# Patient Record
Sex: MALE | Race: WHITE | NOT HISPANIC OR LATINO | Employment: OTHER | URBAN - METROPOLITAN AREA
[De-identification: names, ages, dates, MRNs, and addresses within clinical notes are randomized per-mention and may not be internally consistent; named-entity substitution may affect disease eponyms.]

---

## 2024-01-19 ENCOUNTER — TELEPHONE (OUTPATIENT)
Dept: HEALTH INFORMATION MANAGEMENT | Facility: OTHER | Age: 64
End: 2024-01-19
Payer: COMMERCIAL

## 2024-02-02 ENCOUNTER — APPOINTMENT (OUTPATIENT)
Dept: RADIOLOGY | Facility: IMAGING CENTER | Age: 64
End: 2024-02-02
Attending: NURSE PRACTITIONER
Payer: COMMERCIAL

## 2024-02-02 ENCOUNTER — OFFICE VISIT (OUTPATIENT)
Dept: URGENT CARE | Facility: CLINIC | Age: 64
End: 2024-02-02
Payer: COMMERCIAL

## 2024-02-02 VITALS
DIASTOLIC BLOOD PRESSURE: 60 MMHG | RESPIRATION RATE: 18 BRPM | OXYGEN SATURATION: 88 % | HEIGHT: 68 IN | SYSTOLIC BLOOD PRESSURE: 120 MMHG | BODY MASS INDEX: 40.92 KG/M2 | HEART RATE: 89 BPM | WEIGHT: 270 LBS | TEMPERATURE: 99.9 F

## 2024-02-02 DIAGNOSIS — R06.02 SOB (SHORTNESS OF BREATH): ICD-10-CM

## 2024-02-02 DIAGNOSIS — J44.1 COPD EXACERBATION (HCC): ICD-10-CM

## 2024-02-02 DIAGNOSIS — R60.9 EDEMA, UNSPECIFIED TYPE: ICD-10-CM

## 2024-02-02 PROCEDURE — 71046 X-RAY EXAM CHEST 2 VIEWS: CPT | Mod: TC | Performed by: RADIOLOGY

## 2024-02-02 PROCEDURE — 3074F SYST BP LT 130 MM HG: CPT | Performed by: NURSE PRACTITIONER

## 2024-02-02 PROCEDURE — 3078F DIAST BP <80 MM HG: CPT | Performed by: NURSE PRACTITIONER

## 2024-02-02 PROCEDURE — 99204 OFFICE O/P NEW MOD 45 MIN: CPT | Mod: 25 | Performed by: NURSE PRACTITIONER

## 2024-02-02 PROCEDURE — 94640 AIRWAY INHALATION TREATMENT: CPT | Performed by: NURSE PRACTITIONER

## 2024-02-02 RX ORDER — PREDNISONE 10 MG/1
40 TABLET ORAL DAILY
Qty: 20 TABLET | Refills: 0 | Status: SHIPPED | OUTPATIENT
Start: 2024-02-02 | End: 2024-02-07

## 2024-02-02 RX ORDER — ALBUTEROL SULFATE 90 UG/1
2 AEROSOL, METERED RESPIRATORY (INHALATION) EVERY 6 HOURS PRN
Qty: 8.5 G | Refills: 0 | Status: SHIPPED | OUTPATIENT
Start: 2024-02-02

## 2024-02-02 RX ORDER — DEXAMETHASONE SODIUM PHOSPHATE 10 MG/ML
10 INJECTION INTRAMUSCULAR; INTRAVENOUS ONCE
Status: COMPLETED | OUTPATIENT
Start: 2024-02-02 | End: 2024-02-02

## 2024-02-02 RX ORDER — IPRATROPIUM BROMIDE AND ALBUTEROL SULFATE 2.5; .5 MG/3ML; MG/3ML
3 SOLUTION RESPIRATORY (INHALATION) ONCE
Status: COMPLETED | OUTPATIENT
Start: 2024-02-02 | End: 2024-02-02

## 2024-02-02 RX ORDER — DULOXETIN HYDROCHLORIDE 60 MG/1
90 CAPSULE, DELAYED RELEASE ORAL DAILY
COMMUNITY

## 2024-02-02 RX ORDER — ALBUTEROL SULFATE 90 UG/1
2 AEROSOL, METERED RESPIRATORY (INHALATION) EVERY 6 HOURS PRN
COMMUNITY

## 2024-02-02 RX ORDER — GABAPENTIN 400 MG/1
600 CAPSULE ORAL 3 TIMES DAILY
COMMUNITY
End: 2024-03-01

## 2024-02-02 RX ADMIN — DEXAMETHASONE SODIUM PHOSPHATE 10 MG: 10 INJECTION INTRAMUSCULAR; INTRAVENOUS at 19:53

## 2024-02-02 RX ADMIN — IPRATROPIUM BROMIDE AND ALBUTEROL SULFATE 3 ML: 2.5; .5 SOLUTION RESPIRATORY (INHALATION) at 19:53

## 2024-02-02 ASSESSMENT — ENCOUNTER SYMPTOMS
PALPITATIONS: 0
SPUTUM PRODUCTION: 1
ORTHOPNEA: 1
FEVER: 0
CHILLS: 0
SORE THROAT: 0
SHORTNESS OF BREATH: 1
WHEEZING: 1
COUGH: 1

## 2024-02-03 NOTE — PROGRESS NOTES
Subjective:   Rip Guidry is a 63 y.o. male who presents for Shortness of Breath (X 2 weeks, trouble breathing, hard to breath when laying flat, was giving medication from the RN at the shelter slight improvement )      HPI  Patient is a 63-year-old male who presents urgent care for evaluation of increased shortness of breath that is been going on for the past 2 weeks.  Patient states that he has been having trouble breathing hard to lay flat when he is sleeping.  Patient does live in a shelter in which she was prescribed a medication to help improve his breathing which he is uncertain the name of the medication.  States he did take it for 5 days with mild improvement.  He does have a history of COPD.  Does not wear home O2.  Patient has been experiencing bilateral lower extremity swelling has been followed by PCP in which they have tried Lasix with no significant improvement.  Patient verbalizing that PCP has obtained EKG with no acute findings.  He has no chest pain on today's exam.  Patient is in a wheelchair is able to ambulate and short distances.  Patient adamantly refusing to go to the emergency room for his condition as he knows they will likely admit him.  Patient concerned that if he is admitted in the emergency room he will lose his bed in the shelter and be put out in the streets increasing his risk of mortality due to all underlying health conditions.  Review of Systems   Constitutional:  Positive for malaise/fatigue. Negative for chills and fever.   HENT:  Negative for congestion and sore throat.    Respiratory:  Positive for cough, sputum production, shortness of breath and wheezing.    Cardiovascular:  Positive for orthopnea and leg swelling. Negative for chest pain and palpitations.       Medications:    albuterol Aers  dexamethasone  DULoxetine Cpep  gabapentin Caps  ipratropium-albuterol  metFORMIN Tabs    Allergies: Sulfa drugs    Problem List: Rip Guidry does not have a  "problem list on file.    Surgical History:  No past surgical history on file.    Past Social Hx: Rip Guidry  reports that he has been smoking cigarettes. He has never used smokeless tobacco. He reports that he does not currently use alcohol. He reports that he does not use drugs.     Past Family Hx:  Rip Guidry family history is not on file.     Problem list, medications, and allergies reviewed by myself today in Epic.     Objective:     /60 (BP Location: Left arm, Patient Position: Sitting, BP Cuff Size: Large adult)   Pulse 89   Temp 37.7 °C (99.9 °F) (Temporal)   Resp 18   Ht 1.727 m (5' 8\")   Wt 122 kg (270 lb) Comment: state the pt  SpO2 88%   BMI 41.05 kg/m²     Physical Exam  Vitals and nursing note reviewed.   Constitutional:       General: He is not in acute distress.     Appearance: He is well-developed.   HENT:      Head: Normocephalic and atraumatic.      Right Ear: Tympanic membrane and external ear normal.      Left Ear: Tympanic membrane and external ear normal.      Nose: Nose normal.      Right Sinus: No maxillary sinus tenderness or frontal sinus tenderness.      Left Sinus: No maxillary sinus tenderness or frontal sinus tenderness.      Mouth/Throat:      Mouth: Mucous membranes are moist.      Pharynx: Uvula midline. No posterior oropharyngeal erythema.      Tonsils: No tonsillar exudate or tonsillar abscesses.   Eyes:      General:         Right eye: No discharge.         Left eye: No discharge.      Conjunctiva/sclera: Conjunctivae normal.   Cardiovascular:      Rate and Rhythm: Normal rate.   Pulmonary:      Effort: Pulmonary effort is normal. No respiratory distress.      Breath sounds: Wheezing present.   Abdominal:      General: There is no distension.   Musculoskeletal:         General: Normal range of motion.      Right lower leg: Tenderness present. No bony tenderness. 2+ Edema present.      Left lower leg: Tenderness present. No bony tenderness. 2+ " Edema present.   Skin:     General: Skin is warm and dry.      Findings: No erythema.             Comments: Bilateral lower extremities with discoloration   Neurological:      General: No focal deficit present.      Mental Status: He is alert and oriented to person, place, and time. Mental status is at baseline.      Gait: Gait (gait at baseline) normal.   Psychiatric:         Judgment: Judgment normal.         Assessment/Plan:     Diagnosis and associated orders:     1. SOB (shortness of breath)  DX-CHEST-2 VIEWS    ipratropium-albuterol (DUONEB) nebulizer solution    dexamethasone (Decadron) injection (check route below) 10 mg      2. COPD exacerbation (HCC)  predniSONE (DELTASONE) 10 MG Tab    albuterol 108 (90 Base) MCG/ACT Aero Soln inhalation aerosol      3. Edema, unspecified type             Comments/MDM:     I independently reviewed the patient's imaging and agree with the interpretation of the radiologist.    IMPRESSION:     1.  Mildly enlarged cardiac silhouette with minimal interstitial opacities in the lower lobes which could represent changes of edema. No focal pneumonia.          I personally reviewed prior external notes and prior test results pertinent to today's visit.  Patient able to speak in full sentences, not tachypneic, O2 at rest 88 to 92% on room air.  Patient is in a wheelchair has no chest pain.  X-ray negative for any focal pneumonia patient does have dependent edema concerning signs of CHF.  Patient again adamantly refusing emergency room evaluation as he does not want to get admitted even though he is well aware of his condition.  Patient concerned if he is admitted in the hospital he will be living on the streets increasing his risk of mortality due to the winter conditions.  Discussed risks of not seeking medical attention patient verbalizing understanding on exam he does have audible wheezing does have a history of seasonal COPD for this I will treat him with prednisone breathing  treatment was administered in clinic having improvement.  Patient will follow-up with soon as possible with PCP as he will likely need another round of diuretics.  Discussed management options, risks and benefits, and alternatives to treatment plan agreed upon.   Red flags discussed and indications to immediately call 911 or present to the Emergency Department.   Supportive care, differential diagnoses, and indications for immediate follow-up discussed with patient.    Patient expresses understanding and agrees to plan. Patient denies any other questions or concerns.                      Please note that this dictation was created using voice recognition software. I have made a reasonable attempt to correct obvious errors, but I expect that there are errors of grammar and possibly content that I did not discover before finalizing the note.    This note was electronically signed by Justo SHAW.

## 2024-03-01 ENCOUNTER — OFFICE VISIT (OUTPATIENT)
Dept: CARDIOLOGY | Facility: MEDICAL CENTER | Age: 64
End: 2024-03-01
Attending: INTERNAL MEDICINE
Payer: COMMERCIAL

## 2024-03-01 VITALS
RESPIRATION RATE: 16 BRPM | HEART RATE: 78 BPM | OXYGEN SATURATION: 91 % | BODY MASS INDEX: 4.09 KG/M2 | HEIGHT: 68 IN | SYSTOLIC BLOOD PRESSURE: 106 MMHG | DIASTOLIC BLOOD PRESSURE: 70 MMHG | WEIGHT: 27 LBS

## 2024-03-01 DIAGNOSIS — I50.9 CHRONIC CONGESTIVE HEART FAILURE, UNSPECIFIED HEART FAILURE TYPE (HCC): ICD-10-CM

## 2024-03-01 DIAGNOSIS — R06.02 SOB (SHORTNESS OF BREATH): ICD-10-CM

## 2024-03-01 PROCEDURE — 99204 OFFICE O/P NEW MOD 45 MIN: CPT | Performed by: INTERNAL MEDICINE

## 2024-03-01 PROCEDURE — 99213 OFFICE O/P EST LOW 20 MIN: CPT | Performed by: INTERNAL MEDICINE

## 2024-03-01 PROCEDURE — 93005 ELECTROCARDIOGRAM TRACING: CPT | Performed by: INTERNAL MEDICINE

## 2024-03-01 PROCEDURE — 3074F SYST BP LT 130 MM HG: CPT | Performed by: INTERNAL MEDICINE

## 2024-03-01 PROCEDURE — 3078F DIAST BP <80 MM HG: CPT | Performed by: INTERNAL MEDICINE

## 2024-03-01 RX ORDER — FLUTICASONE PROPIONATE 50 MCG
SPRAY, SUSPENSION (ML) NASAL
COMMUNITY
Start: 2024-02-20

## 2024-03-01 RX ORDER — PREDNISONE 20 MG/1
TABLET ORAL
COMMUNITY
Start: 2023-12-28

## 2024-03-01 RX ORDER — TRAZODONE HYDROCHLORIDE 50 MG/1
TABLET ORAL
COMMUNITY
Start: 2024-01-22

## 2024-03-01 RX ORDER — CELECOXIB 200 MG/1
CAPSULE ORAL
COMMUNITY
Start: 2024-02-14

## 2024-03-01 RX ORDER — TIOTROPIUM BROMIDE INHALATION SPRAY 3.12 UG/1
SPRAY, METERED RESPIRATORY (INHALATION)
COMMUNITY
Start: 2024-02-14

## 2024-03-01 RX ORDER — POTASSIUM CHLORIDE 20 MEQ/1
TABLET, EXTENDED RELEASE ORAL
COMMUNITY
Start: 2024-02-12

## 2024-03-01 RX ORDER — CYCLOBENZAPRINE HCL 10 MG
TABLET ORAL
COMMUNITY
Start: 2024-02-26

## 2024-03-01 RX ORDER — SPIRONOLACTONE 50 MG/1
TABLET, FILM COATED ORAL
COMMUNITY
Start: 2024-02-14

## 2024-03-01 RX ORDER — LORATADINE 10 MG/1
TABLET ORAL
COMMUNITY
Start: 2023-12-28

## 2024-03-01 RX ORDER — FLUTICASONE FUROATE, UMECLIDINIUM BROMIDE AND VILANTEROL TRIFENATATE 100; 62.5; 25 UG/1; UG/1; UG/1
POWDER RESPIRATORY (INHALATION)
COMMUNITY
Start: 2023-12-28

## 2024-03-01 RX ORDER — OMEPRAZOLE 20 MG/1
CAPSULE, DELAYED RELEASE ORAL
COMMUNITY
Start: 2023-12-28

## 2024-03-01 RX ORDER — FUROSEMIDE 40 MG/1
TABLET ORAL
COMMUNITY
Start: 2024-02-26

## 2024-03-01 RX ORDER — HYDROXYZINE HYDROCHLORIDE 25 MG/1
TABLET, FILM COATED ORAL
COMMUNITY
Start: 2024-01-05

## 2024-03-01 RX ORDER — OXYCODONE AND ACETAMINOPHEN 10; 325 MG/1; MG/1
TABLET ORAL
COMMUNITY
Start: 2024-02-20

## 2024-03-01 NOTE — PROGRESS NOTES
Interventional cardiology Initial Consultation Note      Chief Complaint: Lower extremity edema, shortness of breath    Rip Guidry is a 63 y.o. male  patient presented today for evaluation of lower extremity edema, shortness of breath.  Chronic smoker, smokes about half a pack a day.  He was told he did have some heart problems when he was in prison.  He has worsening lower extremity edema, shortness of breath, recently prescribed Lasix which he has been taking for the last couple of days.        History reviewed. No pertinent past medical history.          Current Outpatient Medications   Medication Sig Dispense Refill    furosemide (LASIX) 40 MG Tab       celecoxib (CELEBREX) 200 MG Cap       cyclobenzaprine (FLEXERIL) 10 mg Tab       fluticasone (FLONASE) 50 MCG/ACT nasal spray       TRELEGY ELLIPTA 100-62.5-25 MCG/ACT inhaler       hydrOXYzine HCl (ATARAX) 25 MG Tab       loratadine (CLARITIN) 10 MG Tab       omeprazole (PRILOSEC) 20 MG delayed-release capsule       oxyCODONE-acetaminophen (PERCOCET-10)  MG Tab       potassium chloride SA (KDUR) 20 MEQ Tab CR       predniSONE (DELTASONE) 20 MG Tab       spironolactone (ALDACTONE) 50 MG Tab       SPIRIVA RESPIMAT 2.5 MCG/ACT Aero Soln       traZODone (DESYREL) 50 MG Tab       metFORMIN (GLUCOPHAGE) 500 MG Tab Take 500 mg by mouth 2 times a day with meals.      DULoxetine (CYMBALTA) 60 MG Cap DR Particles delayed-release capsule Take 90 mg by mouth every day.      albuterol 108 (90 Base) MCG/ACT Aero Soln inhalation aerosol Inhale 2 Puffs every 6 hours as needed for Shortness of Breath.      albuterol 108 (90 Base) MCG/ACT Aero Soln inhalation aerosol Inhale 2 Puffs every 6 hours as needed for Shortness of Breath. 8.5 g 0     No current facility-administered medications for this visit.             Physical Exam:  Ambulatory Vitals  /70 (BP Location: Left arm, Patient Position: Sitting, BP Cuff Size: Adult)   Pulse 78   Resp 16   Ht  "1.727 m (5' 8\")   Wt (!) 12.2 kg (27 lb)   SpO2 91%    Oxygen Therapy:  Pulse Oximetry: 91 %  BP Readings from Last 4 Encounters:   03/01/24 106/70   02/02/24 120/60   12/28/15 131/78       Weight/BMI: Body mass index is 4.11 kg/m².  Wt Readings from Last 4 Encounters:   03/01/24 (!) 12.2 kg (27 lb)   02/02/24 122 kg (270 lb)   12/28/15 83 kg (183 lb)           General: Well appearing and in no apparent distress  Neck: carotid bruits absent  Lungs: respiratory sounds  normal  Heart: Regular rhythm,  No palpable thrills on palpation, murmurs absent, no rubs,   Lower extremity edema present.       EKG 3/1/2024  Sinus rhythm      Medical Decision Making:  Problem List Items Addressed This Visit    None  Visit Diagnoses       SOB (shortness of breath)        Relevant Orders    EKG (Completed)    EC-ECHOCARDIOGRAM COMPLETE W/O CONT    Chronic congestive heart failure, unspecified heart failure type (HCC)        Relevant Medications    furosemide (LASIX) 40 MG Tab    spironolactone (ALDACTONE) 50 MG Tab    Other Relevant Orders    [id number] PET W/FLOW RESERVE          His lower extremity edema, shortness of breath concerning for congestive heart failure.  Recommend echocardiogram, stress test to further evaluate.  Advised to take Lasix 40 mg twice daily for 3 or 4 days then 40 mg daily.  Further recommendations, medical therapy based on his ejection fraction, stress test results.  Counseled on smoking cessation.        This note was dictated using Dragon speech recognition software.    Armani ESCOTO  Interventional cardiologist  [facility]  [facility]  [address] [address]  Phone: [phone]  Fax: [phone]               "

## 2024-03-04 LAB — EKG IMPRESSION: NORMAL

## 2024-03-04 PROCEDURE — 93010 ELECTROCARDIOGRAM REPORT: CPT | Performed by: INTERNAL MEDICINE

## 2024-05-17 ENCOUNTER — OFFICE VISIT (OUTPATIENT)
Dept: URGENT CARE | Facility: CLINIC | Age: 64
End: 2024-05-17
Payer: COMMERCIAL

## 2024-05-17 VITALS
SYSTOLIC BLOOD PRESSURE: 122 MMHG | WEIGHT: 270 LBS | HEART RATE: 72 BPM | BODY MASS INDEX: 40.92 KG/M2 | HEIGHT: 68 IN | RESPIRATION RATE: 18 BRPM | DIASTOLIC BLOOD PRESSURE: 70 MMHG | OXYGEN SATURATION: 90 % | TEMPERATURE: 97.6 F

## 2024-05-17 DIAGNOSIS — R60.9 EDEMA, UNSPECIFIED TYPE: ICD-10-CM

## 2024-05-17 DIAGNOSIS — L03.119 CELLULITIS OF LOWER EXTREMITY, UNSPECIFIED LATERALITY: ICD-10-CM

## 2024-05-17 PROCEDURE — 3078F DIAST BP <80 MM HG: CPT | Performed by: FAMILY MEDICINE

## 2024-05-17 PROCEDURE — 99213 OFFICE O/P EST LOW 20 MIN: CPT | Performed by: FAMILY MEDICINE

## 2024-05-17 PROCEDURE — 3074F SYST BP LT 130 MM HG: CPT | Performed by: FAMILY MEDICINE

## 2024-05-17 RX ORDER — AMOXICILLIN AND CLAVULANATE POTASSIUM 875; 125 MG/1; MG/1
1 TABLET, FILM COATED ORAL 2 TIMES DAILY
Qty: 20 TABLET | Refills: 0 | Status: SHIPPED | OUTPATIENT
Start: 2024-05-17 | End: 2024-05-27

## 2024-05-17 ASSESSMENT — ENCOUNTER SYMPTOMS
CONSTITUTIONAL NEGATIVE: 1
COUGH: 1
SPUTUM PRODUCTION: 1
SHORTNESS OF BREATH: 1
LEG SWELLING: 1
MUSCULOSKELETAL NEGATIVE: 1
GASTROINTESTINAL NEGATIVE: 1
EYES NEGATIVE: 1

## 2024-05-17 NOTE — PROGRESS NOTES
"Subjective:   Rip Guidry Jr. is a 63 y.o. male who presents for Leg Swelling (SOB, L leg swelling x 1 year)      Chronic swelling in his legs, cough, being worked up for this by his regular doctor.  Has increased his lasix 40 mg, is taking potassium as well.  He is concerned about the legs getting infected again.  The swelling is down but the skin has broken down with blistering of the lower legs.    Leg Swelling  Associated symptoms include coughing and a rash.       Review of Systems   Constitutional: Negative.    HENT: Negative.     Eyes: Negative.    Respiratory:  Positive for cough, sputum production and shortness of breath.    Gastrointestinal: Negative.    Genitourinary: Negative.    Musculoskeletal: Negative.    Skin:  Positive for rash.       Medications, Allergies, and current problem list reviewed today in Epic.     Objective:     /70   Pulse 72   Temp 36.4 °C (97.6 °F) (Temporal)   Resp 18   Ht 1.727 m (5' 8\")   Wt 122 kg (270 lb)   SpO2 90%     Physical Exam  Vitals and nursing note reviewed.   Constitutional:       Appearance: Normal appearance.   HENT:      Head: Normocephalic and atraumatic.      Nose: Nose normal.   Cardiovascular:      Rate and Rhythm: Normal rate and regular rhythm.      Heart sounds: Normal heart sounds.   Pulmonary:      Breath sounds: Rhonchi present.   Abdominal:      General: Abdomen is flat. Bowel sounds are normal.      Palpations: Abdomen is soft.   Musculoskeletal:      Cervical back: Normal range of motion and neck supple.      Comments: Chronic back pain, cannot lay down to get his legs up   Neurological:      Mental Status: He is alert.         Assessment/Plan:     Diagnosis and associated orders:     1. Edema, unspecified type        2. Cellulitis of lower extremity, unspecified laterality           Comments/MDM:              Differential diagnosis, natural history, supportive care, and indications for immediate follow-up discussed.    Advised " the patient to follow-up with the primary care physician for recheck, reevaluation, and consideration of further management.    Please note that this dictation was created using voice recognition software. I have made a reasonable attempt to correct obvious errors, but I expect that there are errors of grammar and possibly content that I did not discover before finalizing the note.    This note was electronically signed by Mike Cheung M.D.

## 2024-05-23 ENCOUNTER — ANCILLARY PROCEDURE (OUTPATIENT)
Dept: CARDIOLOGY | Facility: MEDICAL CENTER | Age: 64
End: 2024-05-23
Attending: INTERNAL MEDICINE
Payer: COMMERCIAL

## 2024-05-23 DIAGNOSIS — R06.02 SOB (SHORTNESS OF BREATH): ICD-10-CM

## 2024-05-23 PROCEDURE — 93306 TTE W/DOPPLER COMPLETE: CPT

## 2024-05-28 ENCOUNTER — TELEPHONE (OUTPATIENT)
Dept: CARDIOLOGY | Facility: MEDICAL CENTER | Age: 64
End: 2024-05-28

## 2024-05-28 DIAGNOSIS — R60.0 BILATERAL LOWER EXTREMITY EDEMA: ICD-10-CM

## 2024-05-28 DIAGNOSIS — S81.809A MULTIPLE OPEN WOUNDS OF LOWER EXTREMITY, UNSPECIFIED LATERALITY, INITIAL ENCOUNTER: ICD-10-CM

## 2024-05-28 LAB
LV EJECT FRACT  99904: 65
LV EJECT FRACT MOD 2C 99903: 70.47
LV EJECT FRACT MOD 4C 99902: 62.67
LV EJECT FRACT MOD BP 99901: 66.04

## 2024-05-28 PROCEDURE — 93306 TTE W/DOPPLER COMPLETE: CPT | Mod: 26 | Performed by: INTERNAL MEDICINE

## 2024-05-28 NOTE — TELEPHONE ENCOUNTER
Phone Number Called: [phone]    Call outcome: Spoke to patient regarding message below.    Message: Called to inform patient of echo results. 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 Patient reports continued bilateral lower extremity edema that has not improved since being on furosemide. Patient has not completed PET CT; patient provided number for imaging in order to complete. Patient wondering if further testing needs to be done for edema. Advised RN would reach out for further recommendations.     To SC: patient is having continued lower extremity edema despite being on Lasix; per urgent care note on 5/17/24 patient has cellulitis. Please advise on any further follow up at this time, thank you!

## 2024-05-28 NOTE — TELEPHONE ENCOUNTER
----- Message from CAROLIN Muñoz sent at 5/28/2024 10:40 AM PDT -----  No my chart. Echo looks good. Slight elevation in PA pressures, check OPO for MAGUE. Check stress test as previously ordered.     Follow up if testing abnormal. SC

## 2024-05-29 NOTE — TELEPHONE ENCOUNTER
CAROLIN Muñoz  You17 hours ago (4:38 PM)     Continue MAGUE management. Agree with scheduling PET scan. We can do venous ultrasound of legs to check for insufficiency. Continue leg elevation, compression stockings, decrease salt in diet, and hydrate well. If any wounds, refer to wound clinic for management. If ultrasound abnormal, we can refer to vein clinic. SC     Phone Number Called: [phone]    Call outcome: Spoke to patient regarding message below.    Message: Called to inform patient of SC recommendations. Patient is agreeable to plan at this time. Patient does report that he is living in a homeless shelter at this time to it is difficult for him to get rides to appointments but the shelter has been helping with that. All questions answered at this time. Advised to call back with any further questions or concerns.

## 2024-06-06 ENCOUNTER — TELEPHONE (OUTPATIENT)
Dept: CARDIOLOGY | Facility: MEDICAL CENTER | Age: 64
End: 2024-06-06
Payer: MEDICAID

## 2024-06-06 NOTE — TELEPHONE ENCOUNTER
AK    Caller: Brett with [facility]    Topic/issue: Requesting to speak with ordering provider for PET CT. He is asking if this test is the same as a PET with CT with contrast? Please advise.    Callback Number: [phone]     Thank you,  Roxy PATEL

## 2024-06-06 NOTE — TELEPHONE ENCOUNTER
Phone number called: [phone]     Call outcome: Spoke with Brett from [facility] and clarified exam. He requested the phone number for [facility] schedulers, phone number provided. No further questions at this time.

## 2024-06-10 ENCOUNTER — TELEPHONE (OUTPATIENT)
Dept: CARDIOLOGY | Facility: MEDICAL CENTER | Age: 64
End: 2024-06-10
Payer: MEDICAID

## 2024-06-10 NOTE — TELEPHONE ENCOUNTER
AK  Caller: [facility]     Topic/issue: [facility] would like to set up a DR to  to discuss continuation of care between AK and LUCILLE Krueger      Callback Number: [phone]    Thank you   Irma WOODRUFF

## 2024-06-11 NOTE — TELEPHONE ENCOUNTER
Phone Number Called: [phone]     Call outcome: Did not leave a detailed message. Requested office to call back.    Message: Called to ask about  To dr. Moore. Awaiting return call.     Coordinators: If they callback please call my direct ext. Thanks!

## 2024-06-11 NOTE — TELEPHONE ENCOUNTER
Caller: Josee from [facility]    Topic/issue:   Returning call    Callback Number: [phone]    Thank you,    Imelda PAPPAS

## 2024-06-11 NOTE — TELEPHONE ENCOUNTER
Phone Number Called: [phone]     Call outcome: Left message at nurses station for callback regarding more information and upcoming appointment.

## 2024-06-12 ENCOUNTER — TELEPHONE (OUTPATIENT)
Dept: HEALTH INFORMATION MANAGEMENT | Facility: OTHER | Age: 64
End: 2024-06-12
Payer: MEDICAID

## 2024-06-27 LAB — HBA1C MFR BLD: 6.3 % (ref ?–5.8)

## 2024-07-03 ENCOUNTER — APPOINTMENT (OUTPATIENT)
Dept: WOUND CARE | Facility: MEDICAL CENTER | Age: 64
End: 2024-07-03
Payer: MEDICAID

## 2024-07-08 PROCEDURE — RXMED WILLOW AMBULATORY MEDICATION CHARGE: Performed by: INTERNAL MEDICINE

## 2024-07-09 ENCOUNTER — PHARMACY VISIT (OUTPATIENT)
Dept: PHARMACY | Facility: MEDICAL CENTER | Age: 64
End: 2024-07-09
Payer: COMMERCIAL

## 2024-07-09 PROCEDURE — RXMED WILLOW AMBULATORY MEDICATION CHARGE: Performed by: INTERNAL MEDICINE

## 2024-07-10 ENCOUNTER — APPOINTMENT (OUTPATIENT)
Dept: WOUND CARE | Facility: MEDICAL CENTER | Age: 64
End: 2024-07-10
Payer: MEDICAID

## 2024-07-17 ENCOUNTER — APPOINTMENT (OUTPATIENT)
Dept: WOUND CARE | Facility: MEDICAL CENTER | Age: 64
End: 2024-07-17
Payer: MEDICAID

## 2024-07-24 ENCOUNTER — NON-PROVIDER VISIT (OUTPATIENT)
Dept: WOUND CARE | Facility: MEDICAL CENTER | Age: 64
End: 2024-07-24
Attending: NURSE PRACTITIONER
Payer: MEDICAID

## 2024-07-24 PROCEDURE — 97597 DBRDMT OPN WND 1ST 20 CM/<: CPT

## 2024-07-31 ENCOUNTER — OFFICE VISIT (OUTPATIENT)
Dept: WOUND CARE | Facility: MEDICAL CENTER | Age: 64
End: 2024-07-31
Attending: NURSE PRACTITIONER
Payer: MEDICAID

## 2024-07-31 VITALS
RESPIRATION RATE: 18 BRPM | SYSTOLIC BLOOD PRESSURE: 140 MMHG | TEMPERATURE: 97.7 F | OXYGEN SATURATION: 96 % | DIASTOLIC BLOOD PRESSURE: 70 MMHG | HEART RATE: 86 BPM

## 2024-07-31 DIAGNOSIS — R60.0 VENOUS STASIS ULCER OF LEFT LOWER LEG WITH EDEMA OF LEFT LOWER LEG (HCC): ICD-10-CM

## 2024-07-31 DIAGNOSIS — I83.029 VENOUS STASIS ULCER OF LEFT LOWER LEG WITH EDEMA OF LEFT LOWER LEG (HCC): ICD-10-CM

## 2024-07-31 DIAGNOSIS — Z72.0 TOBACCO ABUSE: ICD-10-CM

## 2024-07-31 DIAGNOSIS — Z59.00 HOMELESS: ICD-10-CM

## 2024-07-31 DIAGNOSIS — I83.892 VENOUS STASIS ULCER OF LEFT LOWER LEG WITH EDEMA OF LEFT LOWER LEG (HCC): ICD-10-CM

## 2024-07-31 DIAGNOSIS — E11.8 CONTROLLED TYPE 2 DIABETES MELLITUS WITH COMPLICATION, WITHOUT LONG-TERM CURRENT USE OF INSULIN (HCC): ICD-10-CM

## 2024-07-31 DIAGNOSIS — L97.929 VENOUS STASIS ULCER OF LEFT LOWER LEG WITH EDEMA OF LEFT LOWER LEG (HCC): ICD-10-CM

## 2024-07-31 PROCEDURE — 11042 DBRDMT SUBQ TIS 1ST 20SQCM/<: CPT

## 2024-07-31 SDOH — ECONOMIC STABILITY - HOUSING INSECURITY: HOMELESSNESS UNSPECIFIED: Z59.00

## 2024-07-31 ASSESSMENT — ENCOUNTER SYMPTOMS
NAUSEA: 0
CHILLS: 0
FEVER: 0
DIARRHEA: 1
SHORTNESS OF BREATH: 1
COUGH: 1
CLAUDICATION: 0
BACK PAIN: 1
VOMITING: 0

## 2024-08-07 ENCOUNTER — OFFICE VISIT (OUTPATIENT)
Dept: WOUND CARE | Facility: MEDICAL CENTER | Age: 64
End: 2024-08-07
Attending: NURSE PRACTITIONER
Payer: MEDICAID

## 2024-08-07 VITALS
HEART RATE: 87 BPM | TEMPERATURE: 97 F | DIASTOLIC BLOOD PRESSURE: 78 MMHG | RESPIRATION RATE: 20 BRPM | SYSTOLIC BLOOD PRESSURE: 132 MMHG

## 2024-08-07 DIAGNOSIS — Z72.0 TOBACCO ABUSE: ICD-10-CM

## 2024-08-07 DIAGNOSIS — R60.0 VENOUS STASIS ULCER OF LEFT LOWER LEG WITH EDEMA OF LEFT LOWER LEG (HCC): ICD-10-CM

## 2024-08-07 DIAGNOSIS — L97.929 VENOUS STASIS ULCER OF LEFT LOWER LEG WITH EDEMA OF LEFT LOWER LEG (HCC): ICD-10-CM

## 2024-08-07 DIAGNOSIS — Z59.00 HOMELESS: ICD-10-CM

## 2024-08-07 DIAGNOSIS — S81.801D OPEN WOUND OF RIGHT LOWER LEG, SUBSEQUENT ENCOUNTER: ICD-10-CM

## 2024-08-07 DIAGNOSIS — E11.8 CONTROLLED TYPE 2 DIABETES MELLITUS WITH COMPLICATION, WITHOUT LONG-TERM CURRENT USE OF INSULIN (HCC): ICD-10-CM

## 2024-08-07 DIAGNOSIS — I83.029 VENOUS STASIS ULCER OF LEFT LOWER LEG WITH EDEMA OF LEFT LOWER LEG (HCC): ICD-10-CM

## 2024-08-07 DIAGNOSIS — I83.892 VENOUS STASIS ULCER OF LEFT LOWER LEG WITH EDEMA OF LEFT LOWER LEG (HCC): ICD-10-CM

## 2024-08-07 PROCEDURE — 3078F DIAST BP <80 MM HG: CPT | Performed by: NURSE PRACTITIONER

## 2024-08-07 PROCEDURE — 11042 DBRDMT SUBQ TIS 1ST 20SQCM/<: CPT | Performed by: NURSE PRACTITIONER

## 2024-08-07 PROCEDURE — 3075F SYST BP GE 130 - 139MM HG: CPT | Performed by: NURSE PRACTITIONER

## 2024-08-07 PROCEDURE — 11042 DBRDMT SUBQ TIS 1ST 20SQCM/<: CPT

## 2024-08-07 SDOH — ECONOMIC STABILITY - HOUSING INSECURITY: HOMELESSNESS UNSPECIFIED: Z59.00

## 2024-08-07 NOTE — PATIENT INSTRUCTIONS
-Keep your wound dressing clean, dry, and intact. Only change dressing if it's over saturated, soiled or falls off.     -Remove your compression wrap if you have severe pain, severe swelling, numbness, color change, or temperature change in your toes. If you need to remove your compression wrap, do so by unrolling it. Do not cut the compression wrap off to prevent cutting yourself on accident.    -Should you experience any significant changes in your wound(s), such as infection (redness, swelling, localized heat, increased pain, fever > 101 F, chills) or have any questions regarding your home care instructions, please contact the wound center at [phone]. If after hours, contact your primary care physician or go to the hospital emergency room.

## 2024-08-07 NOTE — PROGRESS NOTES
Provider Encounter- Lower Extremity Ulcer      HISTORY OF PRESENT ILLNESS  Wound History:    START OF CARE IN CLINIC: 07/24/2024               REFERRING PROVIDER: CAROLIN Muñoz               WOUND ETIOLOGY: Venous              LOCATION: Left Anterior LE     WOUND ETIOLOGY: Venous/trauma              LOCATION: Right posterior lower leg                  HISTORY: 63-year-old male referred to [facility] for management of an ulcer to his left anterior lower leg.  He states he has had problems with swelling in his legs for several years, developed this wound approximately 6 months prior to starting treatment in the clinic, during episode of severe lower leg edema.  He was seen in an urgent care in May, treated with antibiotics for cellulitis and a cough.  He is taking diuretics, prescribed by his PCP.  Does not wear compression garments regularly.       Pertinent Medical History: Diabetes mellitus type 2, edema of both lower legs, obesity, tobacco abuse, COPD, homelessness,                        ETIOLOGY HISTORY: Diagnosed no, . Vascular Surgeon: none. Previous vascular procedures none. Compression none. Varicose Veins yes.                   TOBACCO USE:  1 pack a day         Patient's problem list, allergies, and current medications reviewed and updated in Epic    Interval History:  7/31/2024 : Initial provider visit with COLIN Lantigua, YUE-BC, ALVINAN, CFJAS.  Patient presents today accompanied by caregiver from [facility] where he is currently residing.  He states he is feeling well overall.  His wound measures a bit smaller.  He was able to tolerate Tubigrip without difficulty.  His pedal pulses are easily palpated.  2 layer compression wrap initiated in clinic today.  He is able to get wound care as needed from nurse at living facility.   He does not check his blood sugars routinely.  Reports he had lab work done at [facility] recently and that his A1c was 6.4.    8/7/2024 : Clinic visit with Marcy  Logan, COLIN, FNP-BC, CWOCN, CFCN.   Patient is here today accompanied by  from [facility].  States he is feeling well overall.  He does complain that the wrap applied last week was too tight, to the point that he could not elevate or bend his leg.  He did however managed to keep the wrap in place for the entire week.  He requested that the wrap be applied a bit lighter today.     After I left the exam room today, patient informed wound RN that he had a new wound to the posterior aspect of his right calf.  She removed Tubigrip and assessed, measured and photographed.  Topical therapy initiated.      REVIEW OF SYSTEMS:   Unchanged from previous clinic visit on 7/31/2024, except as documented in interval history above      PHYSICAL EXAMINATION:   /78   Pulse 87   Temp 36.1 °C (97 °F) (Temporal)   Resp 20     Physical Exam  Constitutional:       Appearance: He is obese.   HENT:      Mouth/Throat:      Comments: Poor dentition  Cardiovascular:      Rate and Rhythm: Normal rate.      Pulses: Normal pulses.      Comments: DP and PT pulses easily palpated, 2+.  Feet warm  Pulmonary:      Effort: Pulmonary effort is normal.   Musculoskeletal:      Right lower leg: Edema present.      Left lower leg: Edema present.      Comments: 2-3+ edema bilateral lower extremities  Left lower extremity edema improved with compression   Skin:     Comments: Ulcer to left anterior lower extremity, full-thickness: Irregular borders.  Wound measures smaller today.  Thick slough to wound bed.  Moderate serous drainage, no odor.  No periwound erythema or induration    Chronic bilateral lower extremity edema, varicose veins, hemosiderin staining, skin changes-findings consistent with CVI.   Neurological:      Mental Status: He is alert and oriented to person, place, and time.   Psychiatric:         Mood and Affect: Mood normal.         WOUND ASSESSMENT  Wound 07/24/24 Full Thickness Wound Left anterior LE (Active)   Wound  Image    08/07/24 1030   Site Assessment Yellow;Red 08/07/24 1030   Periwound Assessment Blanchable erythema;Hemosiderin Staining;Edema 08/07/24 1030   Margins Attached edges 08/07/24 1030   Drainage Amount Large 08/07/24 1030   Drainage Description Serosanguineous 08/07/24 1030   Treatments Cleansed;Topical Lidocaine;Provider debridement 08/07/24 1030   Wound Cleansing Hypochlorus Acid 08/07/24 1030   Periwound Protectant Moisture Barrier;Skin Moisturizer 08/07/24 1030   Dressing Changed New 08/07/24 1030   Dressing Cleansing/Solutions Not Applicable 08/07/24 1030   Dressing Options Hydrofiber Silver;Super Absorbent Pad;Compression Wrap Two Layer 08/07/24 1030   Dressing Change/Treatment Frequency Weekly, and As Needed 08/07/24 1030   Wound Team Following Weekly 08/07/24 1030   Non-staged Wound Description Full thickness 08/07/24 1030   Wound Length (cm) 4 cm 08/07/24 1030   Wound Width (cm) 4 cm 08/07/24 1030   Wound Depth (cm) 0.3 cm 08/07/24 1030   Wound Surface Area (cm^2) 16 cm^2 08/07/24 1030   Wound Volume (cm^3) 4.8 cm^3 08/07/24 1030   Post-Procedure Length (cm) 3.5 cm 08/07/24 1030   Post-Procedure Width (cm) 4.5 cm 08/07/24 1030   Post-Procedure Depth (cm) 0.1 cm 08/07/24 1030   Post-Procedure Surface Area (cm^2) 15.75 cm^2 08/07/24 1030   Post-Procedure Volume (cm^3) 1.575 cm^3 08/07/24 1030   Wound Healing % -13 08/07/24 1030   Wound Bed Slough (%) 100 % 07/24/24 1100   Tunneling (cm) 0 cm 08/07/24 1030   Undermining (cm) 0 cm 08/07/24 1030   Wound Odor None 08/07/24 1030   Pulses Left;DP;2+ 08/07/24 1030   Exposed Structures Adipose 08/07/24 1030   Number of days: 14       Wound 08/07/24 Venous Ulcer Leg Posterior Right (Active)   Wound Image   08/07/24 1030   Site Assessment Red;Yellow 08/07/24 1030   Periwound Assessment Blanchable erythema;Edema 08/07/24 1030   Margins Attached edges 08/07/24 1030   Drainage Amount Scant 08/07/24 1030   Drainage Description Serosanguineous 08/07/24 1030  "  Treatments Cleansed;Site care 08/07/24 1030   Wound Cleansing Hypochlorus Acid 08/07/24 1030   Periwound Protectant Moisture Barrier;Skin Moisturizer 08/07/24 1030   Dressing Changed New 08/07/24 1030   Dressing Cleansing/Solutions Not Applicable 08/07/24 1030   Dressing Options Hydrofiber Silver;Silicone Adhesive Foam;Tubigrip 08/07/24 1030   Dressing Change/Treatment Frequency Weekly, and As Needed 08/07/24 1030   Wound Team Following Weekly 08/07/24 1030   Non-staged Wound Description Full thickness 08/07/24 1030   Wound Length (cm) 0.7 cm 08/07/24 1030   Wound Width (cm) 0.5 cm 08/07/24 1030   Wound Depth (cm) 0.1 cm 08/07/24 1030   Wound Surface Area (cm^2) 0.35 cm^2 08/07/24 1030   Wound Volume (cm^3) 0.035 cm^3 08/07/24 1030   Tunneling (cm) 0 cm 08/07/24 1030   Undermining (cm) 0 cm 08/07/24 1030   Wound Odor None 08/07/24 1030   Exposed Structures None 08/07/24 1030   Number of days: 0           PROCEDURE: Excisional debridement of left anterior lower leg wound  -2% viscous lidocaine applied topically to wound bed for approximately 5 minutes prior to debridement  -Curette used to excise nonviable tissue from wound bed.  Excisional debridement was performed to remove devitalized tissue until healthy, bleeding tissue was visualized.  Unable to establish 100% clean wound bed due to patient discomfort.  Entire surface of wound, 15.75 cm² debrided.  Tissue debrided into the subcutaneous layer.    -Bleeding controlled with manual pressure.   -Wound care completed by wound RN, refer to wound flowsheet   -Patient tolerated the procedure well, without c/o of significant pain or discomfort.     No excisional debridement of right posterior calf wound, was not made aware of this until after patient had left the clinic      Pertinent Labs and Diagnostics:    Labs:   A1c: No results found for: \"HBA1C\"         IMAGING: None found in epic    VASCULAR STUDIES:   No results found.    LAST  WOUND CULTURE:  DATE :  No " "results found for: \"CULTRSULT\"           ASSESSMENT AND PLAN:     1. Venous stasis ulcer of left lower leg with edema of left lower leg ([facility])    8/7/2024:: Wound measures smaller, thick slough to wound bed.  Patient felt compression wrap was applied too tight last visit, requested that it be applied lighter today  -Excisional debridement of wound in clinic today, medically necessary to promote wound healing.  Debridement terminated prior to establishing 100% clean wound bed due to patient discomfort  -Patient to return to clinic weekly for assessment and debridement  -Continue 2 layer compression wrap.  Wrap less tightly for patient comfort  -Patient is to keep compression wrap dry in between clinic visits in between clinic visits     Wound care: Hydrofiber, superabsorbent foam cover dressing, 2 layer compression wrap    2.  Open wound of right lower leg, subsequent encounter    8/7/2024: Patient presented today with a new wound to his posterior right lower leg.  Stated he scratched himself when he removed his Tubigrip.  -I was not made aware of this wound until after he left the clinic.  Therefore, no debridement today.  -Wound was documented and topical therapy initiated by wound RN  -Will assess each clinic visit, debride if indicated.    Wound care: Silver Hydrofiber, foam cover dressing, Tubigrip      3.  Tobacco abuse    8/7/2024: Patient continues to smoke, does not feel he can quit  -Adverse effects of nicotine on wound healing, and on overall health briefly reviewed in clinic today.      3. Controlled type 2 diabetes mellitus with complication, without long-term current use of insulin ([facility])    8/7/2024: Patient does not check his blood sugars  -A1c obtained from [facility], 6.3% in June of this year.      4. Homeless    8/7/2024: Patient is currently homeless, living at [facility].  Complicating factor, impacts patient's ability for self-care  -Currently has adequate access to food shelter and medical " care    PATIENT EDUCATION  - Etiology of  venous stasis ulceration discussed with patient  - Importance of managing edema for healing of ulcer, and for prevention of new ulcer development  -Need for lifelong compression of lower legs   -Elevate legs above the level of the heart periodically throughout the day.  - Importance of adequate nutrition for wound healing  -Advised to go to ER for any increased redness, swelling, drainage or odor, or if patient develops fever, chills, nausea or vomiting.            Please note that this note may have been created using voice recognition software. I have worked with technical experts from [employer] to optimize the interface.  I have made every reasonable attempt to correct obvious errors, but there may be errors of grammar and possibly     N

## 2024-08-14 ENCOUNTER — OFFICE VISIT (OUTPATIENT)
Dept: WOUND CARE | Facility: MEDICAL CENTER | Age: 64
End: 2024-08-14
Attending: NURSE PRACTITIONER
Payer: MEDICAID

## 2024-08-14 VITALS
DIASTOLIC BLOOD PRESSURE: 78 MMHG | HEART RATE: 97 BPM | TEMPERATURE: 98 F | RESPIRATION RATE: 18 BRPM | SYSTOLIC BLOOD PRESSURE: 118 MMHG

## 2024-08-14 DIAGNOSIS — E11.8 CONTROLLED TYPE 2 DIABETES MELLITUS WITH COMPLICATION, WITHOUT LONG-TERM CURRENT USE OF INSULIN (HCC): ICD-10-CM

## 2024-08-14 DIAGNOSIS — L97.929 VENOUS STASIS ULCER OF LEFT LOWER LEG WITH EDEMA OF LEFT LOWER LEG (HCC): ICD-10-CM

## 2024-08-14 DIAGNOSIS — S81.801D OPEN WOUND OF RIGHT LOWER LEG, SUBSEQUENT ENCOUNTER: ICD-10-CM

## 2024-08-14 DIAGNOSIS — I83.029 VENOUS STASIS ULCER OF LEFT LOWER LEG WITH EDEMA OF LEFT LOWER LEG (HCC): ICD-10-CM

## 2024-08-14 DIAGNOSIS — I83.892 VENOUS STASIS ULCER OF LEFT LOWER LEG WITH EDEMA OF LEFT LOWER LEG (HCC): ICD-10-CM

## 2024-08-14 DIAGNOSIS — R60.0 VENOUS STASIS ULCER OF LEFT LOWER LEG WITH EDEMA OF LEFT LOWER LEG (HCC): ICD-10-CM

## 2024-08-14 DIAGNOSIS — Z72.0 TOBACCO ABUSE: ICD-10-CM

## 2024-08-14 DIAGNOSIS — Z59.00 HOMELESS: ICD-10-CM

## 2024-08-14 PROCEDURE — 3074F SYST BP LT 130 MM HG: CPT | Performed by: NURSE PRACTITIONER

## 2024-08-14 PROCEDURE — 3078F DIAST BP <80 MM HG: CPT | Performed by: NURSE PRACTITIONER

## 2024-08-14 PROCEDURE — 11042 DBRDMT SUBQ TIS 1ST 20SQCM/<: CPT

## 2024-08-14 PROCEDURE — 11042 DBRDMT SUBQ TIS 1ST 20SQCM/<: CPT | Performed by: NURSE PRACTITIONER

## 2024-08-14 SDOH — ECONOMIC STABILITY - HOUSING INSECURITY: HOMELESSNESS UNSPECIFIED: Z59.00

## 2024-08-14 NOTE — PROGRESS NOTES
Provider Encounter- Lower Extremity Ulcer      HISTORY OF PRESENT ILLNESS  Wound History:    START OF CARE IN CLINIC: 07/24/2024               REFERRING PROVIDER: CAROLIN Muñoz               WOUND ETIOLOGY: Venous              LOCATION: Left Anterior LE     WOUND ETIOLOGY: Venous/trauma              LOCATION: Right posterior lower leg                  HISTORY: 63-year-old male referred to [facility] for management of an ulcer to his left anterior lower leg.  He states he has had problems with swelling in his legs for several years, developed this wound approximately 6 months prior to starting treatment in the clinic, during episode of severe lower leg edema.  He was seen in an urgent care in May, treated with antibiotics for cellulitis and a cough.  He is taking diuretics, prescribed by his PCP.  Does not wear compression garments regularly.       Pertinent Medical History: Diabetes mellitus type 2, edema of both lower legs, obesity, tobacco abuse, COPD, homelessness,                        ETIOLOGY HISTORY: Diagnosed no, . Vascular Surgeon: none. Previous vascular procedures none. Compression none. Varicose Veins yes.                   TOBACCO USE:  1 pack a day         Patient's problem list, allergies, and current medications reviewed and updated in Epic    Interval History:  7/31/2024 : Initial provider visit with COLIN Lantigua, YUE-BC, ALVINAN, CFJAS.  Patient presents today accompanied by caregiver from [facility] where he is currently residing.  He states he is feeling well overall.  His wound measures a bit smaller.  He was able to tolerate Tubigrip without difficulty.  His pedal pulses are easily palpated.  2 layer compression wrap initiated in clinic today.  He is able to get wound care as needed from nurse at living facility.   He does not check his blood sugars routinely.  Reports he had lab work done at [facility] recently and that his A1c was 6.4.    8/7/2024 : Clinic visit with Marcy  COLIN Ford, NORA, KAVIN, RISA.   Patient is here today accompanied by  from [facility].  States he is feeling well overall.  He does complain that the wrap applied last week was too tight, to the point that he could not elevate or bend his leg.  He did however managed to keep the wrap in place for the entire week.  He requested that the wrap be applied a bit lighter today.     After I left the exam room today, patient informed wound RN that he had a new wound to the posterior aspect of his right calf.  She removed Tubigrip and assessed, measured and photographed.  Topical therapy initiated.    8/14/2024 : Clinic visit with COLIN Lantigua, NORA, KAVIN, RISA.   Patient presented today with his compression wrap bunched up around his left ankle due to slippage, creating a tourniquet effect, no new wounds.  He was informed that if this were to happen again, he should remove the wrap completely and apply a Tubigrip.  The ulcer to this leg does measure smaller today.  The newer wound to his right posterior lower leg is nearly resolved.      REVIEW OF SYSTEMS:   Unchanged from previous clinic visit on 8/7/2024, except as documented in interval history above      PHYSICAL EXAMINATION:   /78   Pulse 97   Temp 36.7 °C (98 °F)   Resp 18     Physical Exam  Constitutional:       Appearance: He is obese.   HENT:      Mouth/Throat:      Comments: Poor dentition  Cardiovascular:      Rate and Rhythm: Normal rate.      Pulses: Normal pulses.      Comments: DP and PT pulses easily palpated, 2+.  Feet warm  Pulmonary:      Effort: Pulmonary effort is normal.   Musculoskeletal:      Right lower leg: Edema present.      Left lower leg: Edema present.      Comments: 2-3+ edema bilateral lower extremities  Left lower extremity edema improved with compression   Skin:     Comments: Ulcer to left anterior lower extremity, full-thickness: Wound area has decreased.  Slough to wound bed.  Moderate serous  drainage, no odor.  No periwound erythema or induration    Ulcer to right posterior lower leg, first observed in clinic last visit)-presents today with dry stable scab.  No drainage.  No periwound erythema or induration    Chronic bilateral lower extremity edema, varicose veins, hemosiderin staining, skin changes-findings consistent with CVI.   Neurological:      Mental Status: He is alert and oriented to person, place, and time.   Psychiatric:         Mood and Affect: Mood normal.         WOUND ASSESSMENT  Wound 07/24/24 Full Thickness Wound Left anterior LE (Active)   Wound Image    08/14/24 1000   Site Assessment Pink;Red;Yellow 08/14/24 1000   Periwound Assessment Blanchable erythema;Hemosiderin Staining;Edema 08/14/24 1000   Margins Unattached edges 08/14/24 1000   Drainage Amount Large 08/14/24 1000   Drainage Description Serosanguineous 08/14/24 1000   Treatments Cleansed;Topical Lidocaine;Provider debridement;Site care 08/14/24 1000   Wound Cleansing Hypochlorus Acid 08/14/24 1000   Periwound Protectant Skin Moisturizer;Moisture Barrier 08/14/24 1000   Dressing Changed Changed 08/14/24 1000   Dressing Cleansing/Solutions Not Applicable 08/14/24 1000   Dressing Options Hydrofiber Silver;Super Absorbent Pad;Compression Wrap Two Layer 08/14/24 1000   Dressing Change/Treatment Frequency Weekly, and As Needed 08/14/24 1000   Wound Team Following Weekly 08/14/24 1000   Non-staged Wound Description Full thickness 08/14/24 1000   Wound Length (cm) 3 cm 08/14/24 1000   Wound Width (cm) 2.5 cm 08/14/24 1000   Wound Depth (cm) 0.2 cm 08/14/24 1000   Wound Surface Area (cm^2) 7.5 cm^2 08/14/24 1000   Wound Volume (cm^3) 1.5 cm^3 08/14/24 1000   Post-Procedure Length (cm) 3 cm 08/14/24 1000   Post-Procedure Width (cm) 2.9 cm 08/14/24 1000   Post-Procedure Depth (cm) 0.2 cm 08/14/24 1000   Post-Procedure Surface Area (cm^2) 8.7 cm^2 08/14/24 1000   Post-Procedure Volume (cm^3) 1.74 cm^3 08/14/24 1000   Wound Healing % 65  08/14/24 1000   Wound Bed Slough (%) 100 % 07/24/24 1100   Tunneling (cm) 0 cm 08/14/24 1000   Undermining (cm) 0 cm 08/14/24 1000   Wound Odor None 08/14/24 1000   Pulses Left;DP;2+ 08/07/24 1030   Exposed Structures Adipose 08/14/24 1000   Number of days: 21       Wound 08/07/24 Venous Ulcer Leg Posterior Right (Active)   Wound Image   08/14/24 1000   Site Assessment Dry;Brown;Scabbed 08/14/24 1000   Periwound Assessment Dry;Intact 08/14/24 1000   Margins Attached edges 08/07/24 1030   Drainage Amount None 08/14/24 1000   Drainage Description Serosanguineous 08/07/24 1030   Treatments Cleansed;Site care 08/14/24 1000   Wound Cleansing Not Applicable 08/14/24 1000   Periwound Protectant Skin Moisturizer 08/14/24 1000   Dressing Changed New 08/14/24 1000   Dressing Cleansing/Solutions Not Applicable 08/14/24 1000   Dressing Options Open to Air;Tubigrip 08/14/24 1000   Dressing Change/Treatment Frequency Weekly, and As Needed 08/14/24 1000   Wound Team Following Weekly 08/14/24 1000   Non-staged Wound Description Full thickness 08/14/24 1000   Wound Length (cm) 0.7 cm 08/07/24 1030   Wound Width (cm) 0.5 cm 08/07/24 1030   Wound Depth (cm) 0.1 cm 08/07/24 1030   Wound Surface Area (cm^2) 0.35 cm^2 08/07/24 1030   Wound Volume (cm^3) 0.035 cm^3 08/07/24 1030   Tunneling (cm) 0 cm 08/14/24 1000   Undermining (cm) 0 cm 08/14/24 1000   Wound Odor None 08/14/24 1000   Exposed Structures None 08/14/24 1000   Number of days: 7           PROCEDURE: Excisional debridement of left anterior lower leg wound  -2% viscous lidocaine applied topically to wound bed for approximately 5 minutes prior to debridement  -Curette used to excise nonviable tissue from wound bed.  Excisional debridement was performed to remove devitalized tissue until healthy, bleeding tissue was visualized.  Unable to establish 100% clean wound bed due to patient discomfort.  Entire surface of wound, 8.7 cm² debrided.  Tissue debrided into the subcutaneous  "layer.    -Bleeding controlled with manual pressure.   -Wound care completed by wound RN, refer to wound flowsheet   -Patient tolerated the procedure well, without c/o of significant pain or discomfort.     No excisional debridement of right posterior calf wound, was not made aware of this until after patient had left the clinic      Pertinent Labs and Diagnostics:    Labs:   A1c:   Lab Results   Component Value Date/Time    HBA1C 6.3 (A) 06/26/2024 12:00 AM            IMAGING: None found in epic    VASCULAR STUDIES:   No results found.    LAST  WOUND CULTURE:  DATE :  No results found for: \"CULTRSULT\"           ASSESSMENT AND PLAN:     1. Venous stasis ulcer of left lower leg with edema of left lower leg ([facility])    8/14/2024: Wrap applied last visit slid down, creating tourniquet effect around patient's ankle.  No new wounds.  Ulcer measures smaller today, slough to wound bed.  -Excisional debridement of wound in clinic today, medically necessary to promote wound healing.  Debridement terminated prior to establishing 100% clean wound bed due to patient discomfort  -Patient to return to clinic weekly for assessment and debridement  -Continue 2 layer compression wrap.  Wrap less tightly last visit at the request of patient, likely the cause for slippage.  -Patient instructed to remove wrap if it slides down again, place Tubigrip  -Patient is to keep compression wrap dry in between clinic visits in between clinic visits     Wound care: Hydrofiber, superabsorbent foam cover dressing, 2 layer compression wrap    2.  Open wound of right lower leg, subsequent encounter    8/14/2024: Patient presented last week with a new wound to his posterior right lower leg.  Stated he scratched himself when he removed his Tubigrip.  This wound is essentially resolved today, covered with dry scab.  -No need for excisional debridement today    Wound care: Open to air, Tubigrip      3.  Tobacco abuse    8/14/2024: Patient continues to " smoke, though states he is cutting back  -Adverse effects of nicotine on wound healing, and on overall health briefly reviewed in clinic today.      4. Controlled type 2 diabetes mellitus with complication, without long-term current use of insulin ([facility])    8/14/2024: Patient does not check his blood sugars  -A1c obtained from [facility], 6.3% in June of this year.      5. Homeless    8/14/2024: Patient is currently homeless, living at [facility].  Complicating factor, impacts patient's ability for self-care  -Currently has adequate access to food shelter and medical care    PATIENT EDUCATION  - Etiology of  venous stasis ulceration discussed with patient  - Importance of managing edema for healing of ulcer, and for prevention of new ulcer development  -Need for lifelong compression of lower legs   -Elevate legs above the level of the heart periodically throughout the day.  - Importance of adequate nutrition for wound healing  -Advised to go to ER for any increased redness, swelling, drainage or odor, or if patient develops fever, chills, nausea or vomiting.            Please note that this note may have been created using voice recognition software. I have worked with technical experts from [employer] to optimize the interface.  I have made every reasonable attempt to correct obvious errors, but there may be errors of grammar and possibly     N

## 2024-08-14 NOTE — PATIENT INSTRUCTIONS
-Keep your wound dressing clean, dry, and intact. Only change dressing if it's over saturated, soiled or falls off.     -Change your dressing if it becomes soiled, soaked, or falls off.    -Should you experience any significant changes in your wound(s), such as infection (redness, swelling, localized heat, increased pain, fever > 101 F, chills) or have any questions regarding your home care instructions, please contact the wound center at [phone]. If after hours, contact your primary care physician or go to the hospital emergency room.

## 2024-08-21 ENCOUNTER — APPOINTMENT (OUTPATIENT)
Dept: WOUND CARE | Facility: MEDICAL CENTER | Age: 64
End: 2024-08-21
Attending: NURSE PRACTITIONER
Payer: MEDICAID

## 2024-08-22 ENCOUNTER — OFFICE VISIT (OUTPATIENT)
Dept: WOUND CARE | Facility: MEDICAL CENTER | Age: 64
End: 2024-08-22
Attending: NURSE PRACTITIONER
Payer: MEDICAID

## 2024-08-22 VITALS
HEART RATE: 92 BPM | SYSTOLIC BLOOD PRESSURE: 118 MMHG | DIASTOLIC BLOOD PRESSURE: 58 MMHG | TEMPERATURE: 97.1 F | RESPIRATION RATE: 18 BRPM

## 2024-08-22 DIAGNOSIS — R60.0 VENOUS STASIS ULCER OF LEFT LOWER LEG WITH EDEMA OF LEFT LOWER LEG (HCC): ICD-10-CM

## 2024-08-22 DIAGNOSIS — E11.8 CONTROLLED TYPE 2 DIABETES MELLITUS WITH COMPLICATION, WITHOUT LONG-TERM CURRENT USE OF INSULIN (HCC): ICD-10-CM

## 2024-08-22 DIAGNOSIS — I83.029 VENOUS STASIS ULCER OF LEFT LOWER LEG WITH EDEMA OF LEFT LOWER LEG (HCC): ICD-10-CM

## 2024-08-22 DIAGNOSIS — S81.801D OPEN WOUND OF RIGHT LOWER LEG, SUBSEQUENT ENCOUNTER: ICD-10-CM

## 2024-08-22 DIAGNOSIS — I83.892 VENOUS STASIS ULCER OF LEFT LOWER LEG WITH EDEMA OF LEFT LOWER LEG (HCC): ICD-10-CM

## 2024-08-22 DIAGNOSIS — Z59.00 HOMELESS: ICD-10-CM

## 2024-08-22 DIAGNOSIS — Z72.0 TOBACCO ABUSE: ICD-10-CM

## 2024-08-22 DIAGNOSIS — L97.929 VENOUS STASIS ULCER OF LEFT LOWER LEG WITH EDEMA OF LEFT LOWER LEG (HCC): ICD-10-CM

## 2024-08-22 PROCEDURE — 3078F DIAST BP <80 MM HG: CPT | Performed by: NURSE PRACTITIONER

## 2024-08-22 PROCEDURE — 11042 DBRDMT SUBQ TIS 1ST 20SQCM/<: CPT

## 2024-08-22 PROCEDURE — 11042 DBRDMT SUBQ TIS 1ST 20SQCM/<: CPT | Performed by: NURSE PRACTITIONER

## 2024-08-22 PROCEDURE — 3074F SYST BP LT 130 MM HG: CPT | Performed by: NURSE PRACTITIONER

## 2024-08-22 SDOH — ECONOMIC STABILITY - HOUSING INSECURITY: HOMELESSNESS UNSPECIFIED: Z59.00

## 2024-08-22 NOTE — PROGRESS NOTES
Provider Encounter- Lower Extremity Ulcer      HISTORY OF PRESENT ILLNESS  Wound History:    START OF CARE IN CLINIC: 07/24/2024               REFERRING PROVIDER: CAROLIN Muñoz               WOUND ETIOLOGY: Venous              LOCATION: Left Anterior LE     WOUND ETIOLOGY: Venous/trauma              LOCATION: Right posterior lower leg                  HISTORY: 63-year-old male referred to [facility] for management of an ulcer to his left anterior lower leg.  He states he has had problems with swelling in his legs for several years, developed this wound approximately 6 months prior to starting treatment in the clinic, during episode of severe lower leg edema.  He was seen in an urgent care in May, treated with antibiotics for cellulitis and a cough.  He is taking diuretics, prescribed by his PCP.  Does not wear compression garments regularly.       Pertinent Medical History: Diabetes mellitus type 2, edema of both lower legs, obesity, tobacco abuse, COPD, homelessness,                        ETIOLOGY HISTORY: Diagnosed no, . Vascular Surgeon: none. Previous vascular procedures none. Compression none. Varicose Veins yes.                   TOBACCO USE:  1 pack a day         Patient's problem list, allergies, and current medications reviewed and updated in Epic    Interval History:  7/31/2024 : Initial provider visit with COLIN Lantigua, YUE-BC, ALVINAN, CFJAS.  Patient presents today accompanied by caregiver from [facility] where he is currently residing.  He states he is feeling well overall.  His wound measures a bit smaller.  He was able to tolerate Tubigrip without difficulty.  His pedal pulses are easily palpated.  2 layer compression wrap initiated in clinic today.  He is able to get wound care as needed from nurse at living facility.   He does not check his blood sugars routinely.  Reports he had lab work done at [facility] recently and that his A1c was 6.4.    8/7/2024 : Clinic visit with Marcy  COLIN Ford, NORA, KAVIN, RISA.   Patient is here today accompanied by  from [facility].  States he is feeling well overall.  He does complain that the wrap applied last week was too tight, to the point that he could not elevate or bend his leg.  He did however managed to keep the wrap in place for the entire week.  He requested that the wrap be applied a bit lighter today.     After I left the exam room today, patient informed wound RN that he had a new wound to the posterior aspect of his right calf.  She removed Tubigrip and assessed, measured and photographed.  Topical therapy initiated.    8/14/2024 : Clinic visit with COLIN Lantigua, NORA, KAVIN, RISA.   Patient presented today with his compression wrap bunched up around his left ankle due to slippage, creating a tourniquet effect, no new wounds.  He was informed that if this were to happen again, he should remove the wrap completely and apply a Tubigrip.  The ulcer to this leg does measure smaller today.  The newer wound to his right posterior lower leg is resolved.    8/22/2024 : Clinic visit with COLIN Lantigua, NORA, KAVIN, RISA.   Patient continues to feel well.  His wound measures a bit smaller.  Edema well-controlled with compression.  Right lower extremity is also quite edematous, compression wraps applied bilaterally today.  He is still residing at [facility].  Smoking cigarettes occasionally, states he has not smoked today as yet.      REVIEW OF SYSTEMS:   Unchanged from previous clinic visit on 8/14/2024, except as documented in interval history above      PHYSICAL EXAMINATION:   /58   Pulse 92   Temp 36.2 °C (97.1 °F) (Temporal)   Resp 18     Physical Exam  Constitutional:       Appearance: He is obese.   HENT:      Mouth/Throat:      Comments: Poor dentition  Cardiovascular:      Rate and Rhythm: Normal rate.      Pulses: Normal pulses.      Comments: DP and PT pulses easily palpated, 2+.  Feet  warm  Pulmonary:      Effort: Pulmonary effort is normal.   Musculoskeletal:      Right lower leg: Edema present.      Left lower leg: Edema present.      Comments: 2-3+ edema bilateral lower extremities  Left lower extremity edema improved with compression   Skin:     Comments: Ulcer to left anterior lower extremity, full-thickness: Wound area has decreased.  Slough to wound bed.  Moderate serous drainage, no odor.  No evidence of infection    Ulcer to right posterior lower leg, first observed in clinic last visit)-covered with stable dry scab    Chronic bilateral lower extremity edema, varicose veins, hemosiderin staining, skin changes-findings consistent with CVI.   Neurological:      Mental Status: He is alert and oriented to person, place, and time.   Psychiatric:         Mood and Affect: Mood normal.         WOUND ASSESSMENT  Wound 07/24/24 Full Thickness Wound Left anterior LE (Active)   Wound Image    08/22/24 1430   Site Assessment Pink;Red;Granulation tissue 08/22/24 1430   Periwound Assessment Crusted;Blanchable erythema;Edema;Scar tissue 08/22/24 1430   Margins Unattached edges 08/22/24 1430   Drainage Amount Large 08/22/24 1430   Drainage Description Serosanguineous 08/22/24 1430   Treatments Cleansed;Topical Lidocaine;Provider debridement;Site care 08/22/24 1430   Wound Cleansing Hypochlorus Acid 08/22/24 1430   Periwound Protectant Skin Moisturizer 08/22/24 1430   Dressing Changed New 08/22/24 1430   Dressing Cleansing/Solutions Not Applicable 08/22/24 1430   Dressing Options Adaptic;Hydrofiber;Compression Wrap Two Layer 08/22/24 1430   Dressing Change/Treatment Frequency Weekly, and As Needed 08/22/24 1430   Wound Team Following Weekly 08/22/24 1430   Non-staged Wound Description Full thickness 08/22/24 1430   Wound Length (cm) 0.8 cm 08/22/24 1430   Wound Width (cm) 1.5 cm 08/22/24 1430   Wound Depth (cm) 0.1 cm 08/22/24 1430   Wound Surface Area (cm^2) 1.2 cm^2 08/22/24 1430   Wound Volume (cm^3)  "0.12 cm^3 08/22/24 1430   Post-Procedure Length (cm) 1 cm 08/22/24 1430   Post-Procedure Width (cm) 1.7 cm 08/22/24 1430   Post-Procedure Depth (cm) 0.1 cm 08/22/24 1430   Post-Procedure Surface Area (cm^2) 1.7 cm^2 08/22/24 1430   Post-Procedure Volume (cm^3) 0.17 cm^3 08/22/24 1430   Wound Healing % 97 08/22/24 1430   Wound Bed Slough (%) 100 % 07/24/24 1100   Tunneling (cm) 0 cm 08/22/24 1430   Undermining (cm) 0 cm 08/22/24 1430   Wound Odor None 08/22/24 1430   Pulses Left;DP;2+ 08/07/24 1030   Exposed Structures None 08/22/24 1430   Number of days: 29           PROCEDURE: Excisional debridement of left anterior lower leg wound  -2% viscous lidocaine applied topically to wound bed for approximately 5 minutes prior to debridement  -Curette used to excise nonviable tissue from wound bed.  Excisional debridement was performed to remove devitalized tissue until healthy, bleeding tissue was visualized.   Entire surface of wound, 1.7 cm² debrided.  Tissue debrided into the subcutaneous layer.    -Bleeding controlled with manual pressure.   -Wound care completed by wound RN, refer to wound flowsheet   -Patient tolerated the procedure well, without c/o of significant pain or discomfort.     No excisional debridement of right posterior calf wound, was not made aware of this until after patient had left the clinic      Pertinent Labs and Diagnostics:    Labs:   A1c:   Lab Results   Component Value Date/Time    HBA1C 6.3 (A) 06/26/2024 12:00 AM            IMAGING: None found in epic    VASCULAR STUDIES:   No results found.    LAST  WOUND CULTURE:  DATE :  No results found for: \"CULTRSULT\"           ASSESSMENT AND PLAN:     1. Venous stasis ulcer of left lower leg with edema of left lower leg (HCC)    8/22/2024: Wound progressing well, area steadily decreasing  -Excisional debridement of wound in clinic today, medically necessary to promote wound healing.  -Patient to return to clinic weekly for assessment and " debridement  -Continue 2 layer compression wrap.    -Patient instructed to remove wrap if it slides down again, replace with Tubigrip  -Patient is to keep compression wrap dry in between clinic visits in between clinic visits     Wound care: Hydrofiber, superabsorbent foam cover dressing, 2 layer compression wrap    2.  Open wound of right lower leg, subsequent encounter    8/22/2024: Patient presented last week with a new wound to his posterior right lower leg.  Stated he scratched himself when he removed his Tubigrip.  This wound is essentially resolved today, covered with dry scab.  -No need for excisional debridement today  -2-3+ edema, needs additional compression  -Patient would benefit from lifelong compression to both lower extremities.      Wound care: 2 layer compression wrap      3.  Tobacco abuse    8/22/2024: Patient continues to smoke several cigarettes per day.  States he has not yet smoked today  -Adverse effects of nicotine on wound healing, and on overall health briefly reviewed in clinic today.      4. Controlled type 2 diabetes mellitus with complication, without long-term current use of insulin ([facility])    8/22/2024: He does not check his blood sugars  -A1c obtained from [facility], 6.3% in June of this year.      5. Homeless    8/23/2024: Patient still living at [facility].    -Currently has adequate access to food shelter and medical care    PATIENT EDUCATION  - Etiology of  venous stasis ulceration discussed with patient  - Importance of managing edema for healing of ulcer, and for prevention of new ulcer development  -Need for lifelong compression of lower legs   -Elevate legs above the level of the heart periodically throughout the day.  - Importance of adequate nutrition for wound healing  -Advised to go to ER for any increased redness, swelling, drainage or odor, or if patient develops fever, chills, nausea or vomiting.            Please note that this note may have been created using  voice recognition software. I have worked with technical experts from [facility] to optimize the interface.  I have made every reasonable attempt to correct obvious errors, but there may be errors of grammar and possibly     N

## 2024-08-22 NOTE — PATIENT INSTRUCTIONS
Avoid prolonged standing or sitting without elevating your legs.  - Multilayer compression wrap to both legs. Do not get wet and keep on for the week. Only remove if temperature or sensation changes.   If compression needs to be removed, un-wrap it do not cut it off.     Should you experience any significant changes in your wound(s), such as infection (redness, swelling, localized heat, increased pain, fever > 101 F, chills) or have any questions regarding your home care instructions, please contact the wound center at [phone]. If after hours, contact your primary care physician or go to the hospital emergency room.   Keep dressing clean, dry and covered while bathing. Only change dressing if it becomes over saturated, soiled or falls off.

## 2024-08-28 ENCOUNTER — OFFICE VISIT (OUTPATIENT)
Dept: WOUND CARE | Facility: MEDICAL CENTER | Age: 64
End: 2024-08-28
Attending: NURSE PRACTITIONER
Payer: MEDICAID

## 2024-08-28 VITALS
OXYGEN SATURATION: 92 % | SYSTOLIC BLOOD PRESSURE: 100 MMHG | TEMPERATURE: 98 F | RESPIRATION RATE: 18 BRPM | DIASTOLIC BLOOD PRESSURE: 62 MMHG | HEART RATE: 100 BPM

## 2024-08-28 DIAGNOSIS — Z72.0 TOBACCO ABUSE: ICD-10-CM

## 2024-08-28 DIAGNOSIS — I83.892 VENOUS STASIS ULCER OF LEFT LOWER LEG WITH EDEMA OF LEFT LOWER LEG (HCC): ICD-10-CM

## 2024-08-28 DIAGNOSIS — Z59.00 HOMELESS: ICD-10-CM

## 2024-08-28 DIAGNOSIS — L97.929 VENOUS STASIS ULCER OF LEFT LOWER LEG WITH EDEMA OF LEFT LOWER LEG (HCC): ICD-10-CM

## 2024-08-28 DIAGNOSIS — E11.8 CONTROLLED TYPE 2 DIABETES MELLITUS WITH COMPLICATION, WITHOUT LONG-TERM CURRENT USE OF INSULIN (HCC): ICD-10-CM

## 2024-08-28 DIAGNOSIS — R60.0 VENOUS STASIS ULCER OF LEFT LOWER LEG WITH EDEMA OF LEFT LOWER LEG (HCC): ICD-10-CM

## 2024-08-28 DIAGNOSIS — S81.801D OPEN WOUND OF RIGHT LOWER LEG, SUBSEQUENT ENCOUNTER: ICD-10-CM

## 2024-08-28 DIAGNOSIS — I83.029 VENOUS STASIS ULCER OF LEFT LOWER LEG WITH EDEMA OF LEFT LOWER LEG (HCC): ICD-10-CM

## 2024-08-28 PROCEDURE — 11042 DBRDMT SUBQ TIS 1ST 20SQCM/<: CPT

## 2024-08-28 SDOH — ECONOMIC STABILITY - HOUSING INSECURITY: HOMELESSNESS UNSPECIFIED: Z59.00

## 2024-08-28 NOTE — PATIENT INSTRUCTIONS
-Keep your wound dressing clean, dry, and intact.    -Remove your compression wrap if you have severe pain, severe swelling, numbness, color change, or temperature change in your toes. If you need to remove your compression wrap, do so by unrolling it. Do not cut the compression wrap off to prevent cutting yourself on accident.    -Should you experience any significant changes in your wound(s), such as infection (redness, swelling, localized heat, increased pain, fever > 101 F, chills) or have any questions regarding your home care instructions, please contact the wound center at [phone]. If after hours, contact your primary care physician or go to the hospital emergency room.     If you are admitted to any hospital, you will need a new referral to come back to the wound clinic and any scheduled appointments that you already have, may be cancelled.
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
## 2024-09-04 ENCOUNTER — OFFICE VISIT (OUTPATIENT)
Dept: WOUND CARE | Facility: MEDICAL CENTER | Age: 64
End: 2024-09-04
Attending: NURSE PRACTITIONER
Payer: MEDICAID

## 2024-09-04 VITALS
TEMPERATURE: 97.6 F | HEART RATE: 75 BPM | RESPIRATION RATE: 18 BRPM | DIASTOLIC BLOOD PRESSURE: 60 MMHG | SYSTOLIC BLOOD PRESSURE: 119 MMHG

## 2024-09-04 DIAGNOSIS — I83.892 VENOUS STASIS ULCER OF LEFT LOWER LEG WITH EDEMA OF LEFT LOWER LEG (HCC): ICD-10-CM

## 2024-09-04 DIAGNOSIS — Z72.0 TOBACCO ABUSE: ICD-10-CM

## 2024-09-04 DIAGNOSIS — Z59.00 HOMELESS: ICD-10-CM

## 2024-09-04 DIAGNOSIS — S81.801D OPEN WOUND OF RIGHT LOWER LEG, SUBSEQUENT ENCOUNTER: ICD-10-CM

## 2024-09-04 DIAGNOSIS — R60.0 VENOUS STASIS ULCER OF LEFT LOWER LEG WITH EDEMA OF LEFT LOWER LEG (HCC): ICD-10-CM

## 2024-09-04 DIAGNOSIS — E11.8 CONTROLLED TYPE 2 DIABETES MELLITUS WITH COMPLICATION, WITHOUT LONG-TERM CURRENT USE OF INSULIN (HCC): ICD-10-CM

## 2024-09-04 DIAGNOSIS — I83.029 VENOUS STASIS ULCER OF LEFT LOWER LEG WITH EDEMA OF LEFT LOWER LEG (HCC): ICD-10-CM

## 2024-09-04 DIAGNOSIS — L97.929 VENOUS STASIS ULCER OF LEFT LOWER LEG WITH EDEMA OF LEFT LOWER LEG (HCC): ICD-10-CM

## 2024-09-04 PROCEDURE — 3074F SYST BP LT 130 MM HG: CPT | Performed by: NURSE PRACTITIONER

## 2024-09-04 PROCEDURE — 11042 DBRDMT SUBQ TIS 1ST 20SQCM/<: CPT

## 2024-09-04 PROCEDURE — 11042 DBRDMT SUBQ TIS 1ST 20SQCM/<: CPT | Performed by: NURSE PRACTITIONER

## 2024-09-04 PROCEDURE — 3078F DIAST BP <80 MM HG: CPT | Performed by: NURSE PRACTITIONER

## 2024-09-04 SDOH — ECONOMIC STABILITY - HOUSING INSECURITY: HOMELESSNESS UNSPECIFIED: Z59.00

## 2024-09-04 NOTE — PROGRESS NOTES
Advanced Wound Care   [facility]   [address]   [address]   [place] [address]   [phone] Fax: [phone]        Duration of Supply Order: 90 Days  Dispense as written.    Circaid compression garment ordered via [employer] portal.  Measurements in cm as follows:  Calf - 48  Ankle - 27  Length - 46         Wound 07/24/24 Full Thickness Wound Left anterior LE (Active)   Wound Image     09/04/24 0845   Site Assessment Red;Yellow;Painful 09/04/24 0845   Periwound Assessment Blistered;Blanchable erythema;Edema;Fragile;Painful 09/04/24 0845   Margins Attached edges 09/04/24 0845   Drainage Amount Moderate 09/04/24 0845   Drainage Description Serosanguineous 09/04/24 0845   Treatments Cleansed;Topical Lidocaine;Provider debridement;Site care;Compression 09/04/24 0845   Wound Cleansing Hypochlorus Acid 09/04/24 0845   Periwound Protectant Skin Moisturizer;Barrier Paste 09/04/24 0845   Dressing Changed New 08/28/24 1300   Dressing Cleansing/Solutions Not Applicable 09/04/24 0845   Dressing Options Hydrofiber Silver;Unna Boot;Coban;Tubigrip 09/04/24 0845   Dressing Change/Treatment Frequency Weekly, and As Needed 09/04/24 0845   Wound Team Following Weekly 08/28/24 1300   Non-staged Wound Description Full thickness 09/04/24 0845   Wound Length (cm) 1.5 cm 09/04/24 0845   Wound Width (cm) 2 cm 09/04/24 0845   Wound Depth (cm) 0.1 cm 09/04/24 0845   Wound Surface Area (cm^2) 3 cm^2 09/04/24 0845   Wound Volume (cm^3) 0.3 cm^3 09/04/24 0845   Post-Procedure Length (cm) 1.6 cm 09/04/24 0845   Post-Procedure Width (cm) 2.3 cm 09/04/24 0845   Post-Procedure Depth (cm) 0.2 cm 09/04/24 0845   Post-Procedure Surface Area (cm^2) 3.68 cm^2 09/04/24 0845   Post-Procedure Volume (cm^3) 0.736 cm^3 09/04/24 0845   Wound Healing % 93 09/04/24 0845   Wound Bed Slough (%) 100 % 07/24/24 1100   Tunneling (cm) 0 cm 09/04/24 0845   Undermining (cm) 0 cm 09/04/24 0845   Wound Odor None  09/04/24 0845   Pulses Left;DP;2+ 08/07/24 1030   Exposed Structures None 09/04/24 0839

## 2024-09-04 NOTE — PATIENT INSTRUCTIONS
-Keep dressings clean and dry. Change dressings if they become over saturated, soiled or fall off.     -If you need to change your dressings at home: Wash your wound with normal saline, wound cleanser, or unscented soap and water prior to applying your new dressings. Please avoid cleansing with hydrogen peroxide or rubbing alcohol. Hydrogen peroxide and rubbing alcohol are toxic to new tissue and skin cells.    -Avoid prolonged standing or sitting without elevating your legs.    -Remove your compression garments if you have severe pain, severe swelling, numbness, color change, or temperature change in your toes. If you need to remove your compression garments, do so by unrolling them. Do not cut the compression garments off, this is to prevent cutting yourself on accident.    -Should you experience any significant changes in your wound(s), such as signs of infection (increasing redness, swelling, localized heat, increased pain, fever > 101 F, chills) or have any questions regarding your home care instructions, please contact the wound center at [phone]. If after hours, contact your primary care physician or go to the hospital emergency room.     -If you are admitted to any hospital, you will need a new referral to come back to the wound clinic and any scheduled appointments that you already have, may be cancelled.     -If you are 5 or more minutes late for an appointment, we reserve the right to cancel and reschedule that appointment. Additionally, if you are habitually late or not showing (3 late cancellations and/or no shows), we reserve the right to cancel your remaining appointments and it will be your responsibility to obtain a new referral if services are still needed.

## 2024-09-04 NOTE — PROGRESS NOTES
Patient reports that he had to remove the compression wrap applied at last weeks visit the same day it was placed due to irritation/pain/redness. Patient reports that at last visit sof roll was placed under 2 layer wrap.  This RN began to place 2 layer wrap. Patient reported burning/irritation/pain that did not subside after 5 minutes. Unna's boot compression wrap applied instead.  Patient reacts poorly to blue layer of 2 layer compression wrap and sof roll.  Spoke with patient's  after appointment. They would prefer that patient obtain wound care at [facility] due to it being closer, and patient is able to get to appointments on his own versus [facility] having to provide a ride.   Patient and provider had discussed above issue during appointment. Patient was instructed that he can talk to [facility] to transfer care if he'd like.  states that they will try 2 more weeks of appointments at [facility] outpatient wound care and re evaluate at that point.
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
## 2024-09-11 ENCOUNTER — OFFICE VISIT (OUTPATIENT)
Dept: WOUND CARE | Facility: MEDICAL CENTER | Age: 64
End: 2024-09-11
Attending: NURSE PRACTITIONER
Payer: MEDICAID

## 2024-09-11 VITALS
SYSTOLIC BLOOD PRESSURE: 118 MMHG | DIASTOLIC BLOOD PRESSURE: 72 MMHG | HEART RATE: 86 BPM | TEMPERATURE: 98 F | RESPIRATION RATE: 18 BRPM

## 2024-09-11 DIAGNOSIS — I89.0 LYMPHEDEMA DUE TO VENOUS INSUFFICIENCY: ICD-10-CM

## 2024-09-11 DIAGNOSIS — I87.2 LYMPHEDEMA DUE TO VENOUS INSUFFICIENCY: ICD-10-CM

## 2024-09-11 DIAGNOSIS — R60.0 VENOUS STASIS ULCER OF LEFT LOWER LEG WITH EDEMA OF LEFT LOWER LEG (HCC): ICD-10-CM

## 2024-09-11 DIAGNOSIS — E11.8 CONTROLLED TYPE 2 DIABETES MELLITUS WITH COMPLICATION, WITHOUT LONG-TERM CURRENT USE OF INSULIN (HCC): ICD-10-CM

## 2024-09-11 DIAGNOSIS — L97.929 VENOUS STASIS ULCER OF LEFT LOWER LEG WITH EDEMA OF LEFT LOWER LEG (HCC): ICD-10-CM

## 2024-09-11 DIAGNOSIS — I83.892 VENOUS STASIS ULCER OF LEFT LOWER LEG WITH EDEMA OF LEFT LOWER LEG (HCC): ICD-10-CM

## 2024-09-11 DIAGNOSIS — I83.029 VENOUS STASIS ULCER OF LEFT LOWER LEG WITH EDEMA OF LEFT LOWER LEG (HCC): ICD-10-CM

## 2024-09-11 DIAGNOSIS — S81.801D OPEN WOUND OF RIGHT LOWER LEG, SUBSEQUENT ENCOUNTER: ICD-10-CM

## 2024-09-11 DIAGNOSIS — Z72.0 TOBACCO ABUSE: ICD-10-CM

## 2024-09-11 DIAGNOSIS — Z59.00 HOMELESS: ICD-10-CM

## 2024-09-11 PROCEDURE — 3078F DIAST BP <80 MM HG: CPT | Performed by: STUDENT IN AN ORGANIZED HEALTH CARE EDUCATION/TRAINING PROGRAM

## 2024-09-11 PROCEDURE — 11042 DBRDMT SUBQ TIS 1ST 20SQCM/<: CPT

## 2024-09-11 PROCEDURE — 99213 OFFICE O/P EST LOW 20 MIN: CPT | Mod: 25 | Performed by: STUDENT IN AN ORGANIZED HEALTH CARE EDUCATION/TRAINING PROGRAM

## 2024-09-11 PROCEDURE — 99213 OFFICE O/P EST LOW 20 MIN: CPT

## 2024-09-11 PROCEDURE — 11042 DBRDMT SUBQ TIS 1ST 20SQCM/<: CPT | Performed by: STUDENT IN AN ORGANIZED HEALTH CARE EDUCATION/TRAINING PROGRAM

## 2024-09-11 PROCEDURE — 3074F SYST BP LT 130 MM HG: CPT | Performed by: STUDENT IN AN ORGANIZED HEALTH CARE EDUCATION/TRAINING PROGRAM

## 2024-09-11 RX ORDER — CEPHALEXIN 500 MG/1
500 CAPSULE ORAL 4 TIMES DAILY
Qty: 20 CAPSULE | Refills: 0 | Status: SHIPPED | OUTPATIENT
Start: 2024-09-11 | End: 2024-09-16

## 2024-09-11 SDOH — ECONOMIC STABILITY - HOUSING INSECURITY: HOMELESSNESS UNSPECIFIED: Z59.00
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
## 2024-09-11 NOTE — PATIENT INSTRUCTIONS
-Keep your wound dressing clean, dry, and intact.     -Remove your compression wrap if you have severe pain, severe swelling, numbness, color change, or temperature change in your toes. If you need to remove your compression wrap, do so by unrolling it. Do not cut the compression wrap off to prevent cutting yourself on accident.    -Should you experience any significant changes in your wound(s), such as infection (redness, swelling, localized heat, increased pain, fever > 101 F, chills) or have any questions regarding your home care instructions, please contact the wound center at [phone]. If after hours, contact your primary care physician or go to the hospital emergency room.     If you are admitted to any hospital, you will need a new referral to come back to the wound clinic and any scheduled appointments that you already have, may be cancelled.

## 2024-09-12 NOTE — PROGRESS NOTES
Measurements taken of patient's lower extremities to order Solaris Ready wrap fusion kit for bilateral lower extremities.    Right lower extremity:  Calf circumference-47 cm  Ankle circumference- 28 cm  Wrap length- 38 cm    Left lower extremity:  Calf circumference - 45 cm  Ankle circumference - 27 cm  Wrap length - 38 cm    Pt displays new blanchable erythema, increased swelling, and new tenderness to right lower extremity after obtaining abrasions. Outlined erythema in sharpie and instructed to call [facility] or go to ER if redness extends past outline. Provided [facility] brochure. Instructed pt to  antibiotics later today as prescribed by Dr. Ceballos.

## 2024-09-13 ENCOUNTER — TELEPHONE (OUTPATIENT)
Dept: WOUND CARE | Facility: MEDICAL CENTER | Age: 64
End: 2024-09-13
Payer: MEDICAID

## 2024-09-13 NOTE — TELEPHONE ENCOUNTER
Order for Solaris Ready Wrap Fusion Kit ordered for bilateral lower extremities via [employer]

## 2024-09-18 ENCOUNTER — NON-PROVIDER VISIT (OUTPATIENT)
Dept: WOUND CARE | Facility: MEDICAL CENTER | Age: 64
End: 2024-09-18
Attending: NURSE PRACTITIONER
Payer: MEDICAID

## 2024-09-18 PROCEDURE — 99211 OFF/OP EST MAY X REQ PHY/QHP: CPT

## 2024-09-18 NOTE — PATIENT INSTRUCTIONS
-Keep your wound dressing clean, dry, and intact. Only change dressing if it's over saturated, soiled or falls off.     -Should you experience any significant changes in your wound(s), such as infection (redness, swelling, localized heat, increased pain, fever > 101 F, chills) or have any questions regarding your home care instructions, please contact the wound center at [phone]. If after hours, contact your primary care physician or go to the hospital emergency room.

## 2024-09-18 NOTE — WOUND TEAM
Patient's bilateral leg wounds resolved today with new epithelial tissue. Patient was ordered bilateral solaris ready wrap fusion kits last week 9/11, patient states that they have not yet been delivered to the [facility] where he resides, but has spoken to the [email or web address] company. Discussed follow up options with Dr. Ceballos, per MD, patient to return next week on RN visit to ensure patient is able to correctly apply wraps. Patient will likely be discharged at that time.

## 2024-09-25 ENCOUNTER — APPOINTMENT (OUTPATIENT)
Dept: WOUND CARE | Facility: MEDICAL CENTER | Age: 64
End: 2024-09-25
Attending: NURSE PRACTITIONER
Payer: MEDICAID

## 2024-09-26 ENCOUNTER — NON-PROVIDER VISIT (OUTPATIENT)
Dept: WOUND CARE | Facility: MEDICAL CENTER | Age: 64
End: 2024-09-26
Attending: NURSE PRACTITIONER
Payer: MEDICAID

## 2024-09-26 VITALS
SYSTOLIC BLOOD PRESSURE: 122 MMHG | RESPIRATION RATE: 18 BRPM | DIASTOLIC BLOOD PRESSURE: 70 MMHG | HEART RATE: 82 BPM | TEMPERATURE: 97.1 F

## 2024-09-26 PROCEDURE — 99211 OFF/OP EST MAY X REQ PHY/QHP: CPT

## 2024-09-26 NOTE — PATIENT INSTRUCTIONS
-Keep your wound dressing clean, dry, and intact.     -Change your dressing if it becomes soiled, soaked, or falls off.    -If you need to change your dressings at home: Wash your wound with normal saline, wound cleanser, or unscented soap and water prior to applying your new dressings. Please avoid cleansing with hydrogen peroxide or rubbing alcohol. Hydrogen peroxide and rubbing alcohol are toxic to new tissue and skin cells.    - Resolved wound will be fragile for a few days. Bathe and dry area gently. The wound site only ever regains a maximum of 80% of the tensile strength of the surrounding skin, remodeling of scars can continue for 6 months to a year. Contact your primary care provider for a new referral if there are any problems with the area opening and draining again.    -Should you experience any significant changes in your wound(s), such as signs of infection (increasing redness, swelling, localized heat, increased pain, fever > 101 F, chills) or have any questions regarding your home care instructions, please contact the wound center at [phone]. If after hours, contact your primary care physician or go to the hospital emergency room.     If you are admitted to any hospital, you will need a new referral to come back to the wound clinic and any scheduled appointments that you already have may be cancelled.     -If you are 5 or more minutes late for an appointment, we reserve the right to cancel and reschedule that appointment. Additionally, if you are habitually late or not attending appts (3 late cancellations and/or no shows), we reserve the right to cancel your remaining appointments and it will be your responsibility to obtain a new referral if services are still needed.

## 2024-09-26 NOTE — PROGRESS NOTES
Patient educated on how to put on solaris wraps that were ordered. Had a difficult time putting them on due to back pain and multiple surgeries. However patient stated that he would have help at home. Showed him sock cabrera that could be ordered on [employer] to help aid with compression sock application. Patient stated that he understands how to apply solaris wraps and did not have any questions.     Per patient, tubigrips on left leg was extremely tight and caused left leg wound to open up over the week. Wound had scabbed over, however there was a small amount of blanchable redness around the wound. Patient denied any fevers, increased pain or drainage around wound site. Educated him that if his swelling gets worse, has any increased pain, or increased drainage or pus that he should go to urgent care or the ER. Patient verbalized understanding.     Patient will be discharged from the wound clinic as he will be moving to [place]. All further questions and concerns were addressed.

## 2024-09-26 NOTE — CERTIFICATION
[facility]   [facility]   [address]   [address]   [place] [address]   [phone] Fax: [phone]    Discharge Note        Wound Etiology: trauma  Wound location: Right and left lower leg  Date of Discharge: 09/26/2024    Assessment:  Discharge patient at this time secondary to wound resolution.    Patient educated on how to put on solaris wraps that were ordered. Had a difficult time putting them on due to back pain and multiple surgeries. However patient stated that he would have help at home. Showed him sock cabrera that could be ordered on [employer] to help aid with compression sock application. Patient stated that he understands how to apply solaris wraps and did not have any questions.      Per patient, tubigrips on left leg was extremely tight and caused left leg wound to open up over the week. Wound had scabbed over, however there was a small amount of blanchable redness around the wound. Patient denied any fevers, increased pain or drainage around wound site. Educated him that if his swelling gets worse, has any increased pain, or increased drainage or pus that he should go to urgent care or the ER. Patient verbalized understanding.      Patient will be discharged from the wound clinic as he will be moving to [place]. All further questions and concerns were addressed.

## 2024-10-02 ENCOUNTER — APPOINTMENT (OUTPATIENT)
Dept: WOUND CARE | Facility: MEDICAL CENTER | Age: 64
End: 2024-10-02
Attending: NURSE PRACTITIONER
Payer: MEDICARE

## 2024-10-03 ENCOUNTER — APPOINTMENT (OUTPATIENT)
Dept: WOUND CARE | Facility: MEDICAL CENTER | Age: 64
End: 2024-10-03
Attending: NURSE PRACTITIONER
Payer: MEDICARE

## 2024-10-09 ENCOUNTER — APPOINTMENT (OUTPATIENT)
Dept: WOUND CARE | Facility: MEDICAL CENTER | Age: 64
End: 2024-10-09
Attending: NURSE PRACTITIONER
Payer: MEDICARE

## 2025-06-26 ENCOUNTER — APPOINTMENT (OUTPATIENT)
Dept: RADIOLOGY | Facility: MEDICAL CENTER | Age: 65
End: 2025-06-26
Payer: MEDICARE

## 2025-06-26 ENCOUNTER — HOSPITAL ENCOUNTER (EMERGENCY)
Facility: MEDICAL CENTER | Age: 65
End: 2025-06-26
Attending: STUDENT IN AN ORGANIZED HEALTH CARE EDUCATION/TRAINING PROGRAM
Payer: MEDICARE

## 2025-06-26 ENCOUNTER — APPOINTMENT (OUTPATIENT)
Dept: RADIOLOGY | Facility: MEDICAL CENTER | Age: 65
End: 2025-06-26
Attending: STUDENT IN AN ORGANIZED HEALTH CARE EDUCATION/TRAINING PROGRAM
Payer: MEDICARE

## 2025-06-26 VITALS
SYSTOLIC BLOOD PRESSURE: 154 MMHG | RESPIRATION RATE: 20 BRPM | BODY MASS INDEX: 37.59 KG/M2 | TEMPERATURE: 97.3 F | HEIGHT: 68 IN | DIASTOLIC BLOOD PRESSURE: 68 MMHG | OXYGEN SATURATION: 91 % | WEIGHT: 248 LBS | HEART RATE: 75 BPM

## 2025-06-26 DIAGNOSIS — Z76.0 MEDICATION REFILL: Primary | ICD-10-CM

## 2025-06-26 LAB
ALBUMIN SERPL BCP-MCNC: 3.5 G/DL (ref 3.2–4.9)
ALBUMIN/GLOB SERPL: 1 G/DL
ALP SERPL-CCNC: 79 U/L (ref 30–99)
ALT SERPL-CCNC: 11 U/L (ref 2–50)
ANION GAP SERPL CALC-SCNC: 10 MMOL/L (ref 7–16)
AST SERPL-CCNC: 15 U/L (ref 12–45)
BASOPHILS # BLD AUTO: 0.7 % (ref 0–1.8)
BASOPHILS # BLD: 0.09 K/UL (ref 0–0.12)
BILIRUB SERPL-MCNC: <0.2 MG/DL (ref 0.1–1.5)
BUN SERPL-MCNC: 12 MG/DL (ref 8–22)
CALCIUM ALBUM COR SERPL-MCNC: 9.3 MG/DL (ref 8.5–10.5)
CALCIUM SERPL-MCNC: 8.9 MG/DL (ref 8.5–10.5)
CHLORIDE SERPL-SCNC: 107 MMOL/L (ref 96–112)
CO2 SERPL-SCNC: 24 MMOL/L (ref 20–33)
CREAT SERPL-MCNC: 0.91 MG/DL (ref 0.5–1.4)
EOSINOPHIL # BLD AUTO: 0.39 K/UL (ref 0–0.51)
EOSINOPHIL NFR BLD: 3 % (ref 0–6.9)
ERYTHROCYTE [DISTWIDTH] IN BLOOD BY AUTOMATED COUNT: 42.1 FL (ref 35.9–50)
GFR SERPLBLD CREATININE-BSD FMLA CKD-EPI: 93 ML/MIN/1.73 M 2
GLOBULIN SER CALC-MCNC: 3.4 G/DL (ref 1.9–3.5)
GLUCOSE SERPL-MCNC: 152 MG/DL (ref 65–99)
HCT VFR BLD AUTO: 46.7 % (ref 42–52)
HGB BLD-MCNC: 15.1 G/DL (ref 14–18)
IMM GRANULOCYTES # BLD AUTO: 0.05 K/UL (ref 0–0.11)
IMM GRANULOCYTES NFR BLD AUTO: 0.4 % (ref 0–0.9)
LYMPHOCYTES # BLD AUTO: 2.56 K/UL (ref 1–4.8)
LYMPHOCYTES NFR BLD: 19.8 % (ref 22–41)
MCH RBC QN AUTO: 29 PG (ref 27–33)
MCHC RBC AUTO-ENTMCNC: 32.3 G/DL (ref 32.3–36.5)
MCV RBC AUTO: 89.8 FL (ref 81.4–97.8)
MONOCYTES # BLD AUTO: 0.86 K/UL (ref 0–0.85)
MONOCYTES NFR BLD AUTO: 6.7 % (ref 0–13.4)
NEUTROPHILS # BLD AUTO: 8.95 K/UL (ref 1.82–7.42)
NEUTROPHILS NFR BLD: 69.4 % (ref 44–72)
NRBC # BLD AUTO: 0 K/UL
NRBC BLD-RTO: 0 /100 WBC (ref 0–0.2)
NT-PROBNP SERPL IA-MCNC: 342 PG/ML (ref 0–125)
PLATELET # BLD AUTO: 318 K/UL (ref 164–446)
PMV BLD AUTO: 10.7 FL (ref 9–12.9)
POTASSIUM SERPL-SCNC: 4.2 MMOL/L (ref 3.6–5.5)
PROT SERPL-MCNC: 6.9 G/DL (ref 6–8.2)
RBC # BLD AUTO: 5.2 M/UL (ref 4.7–6.1)
SODIUM SERPL-SCNC: 141 MMOL/L (ref 135–145)
TROPONIN T SERPL-MCNC: 13 NG/L (ref 6–19)
WBC # BLD AUTO: 12.9 K/UL (ref 4.8–10.8)

## 2025-06-26 PROCEDURE — 93005 ELECTROCARDIOGRAM TRACING: CPT | Mod: TC

## 2025-06-26 PROCEDURE — 84484 ASSAY OF TROPONIN QUANT: CPT

## 2025-06-26 PROCEDURE — 80053 COMPREHEN METABOLIC PANEL: CPT

## 2025-06-26 PROCEDURE — 71045 X-RAY EXAM CHEST 1 VIEW: CPT

## 2025-06-26 PROCEDURE — 700101 HCHG RX REV CODE 250: Performed by: STUDENT IN AN ORGANIZED HEALTH CARE EDUCATION/TRAINING PROGRAM

## 2025-06-26 PROCEDURE — 93005 ELECTROCARDIOGRAM TRACING: CPT | Mod: TC | Performed by: STUDENT IN AN ORGANIZED HEALTH CARE EDUCATION/TRAINING PROGRAM

## 2025-06-26 PROCEDURE — 99284 EMERGENCY DEPT VISIT MOD MDM: CPT

## 2025-06-26 PROCEDURE — 85025 COMPLETE CBC W/AUTO DIFF WBC: CPT

## 2025-06-26 PROCEDURE — 83880 ASSAY OF NATRIURETIC PEPTIDE: CPT

## 2025-06-26 PROCEDURE — 36415 COLL VENOUS BLD VENIPUNCTURE: CPT

## 2025-06-26 RX ORDER — ATORVASTATIN CALCIUM 40 MG/1
TABLET, FILM COATED ORAL
Qty: 30 TABLET | Refills: 0 | Status: SHIPPED | OUTPATIENT
Start: 2025-06-26

## 2025-06-26 RX ORDER — ALBUTEROL SULFATE 90 UG/1
2 INHALANT RESPIRATORY (INHALATION) EVERY 6 HOURS PRN
Qty: 8.5 G | Refills: 0 | Status: SHIPPED | OUTPATIENT
Start: 2025-06-26

## 2025-06-26 RX ORDER — FUROSEMIDE 40 MG/1
40 TABLET ORAL DAILY
Qty: 30 TABLET | Refills: 0 | Status: SHIPPED | OUTPATIENT
Start: 2025-06-26

## 2025-06-26 RX ORDER — ATORVASTATIN CALCIUM 40 MG/1
TABLET, FILM COATED ORAL
Qty: 30 TABLET | Refills: 0 | Status: SHIPPED | OUTPATIENT
Start: 2025-06-26 | End: 2025-06-26

## 2025-06-26 RX ORDER — FUROSEMIDE 40 MG/1
40 TABLET ORAL DAILY
Qty: 30 TABLET | Refills: 0 | Status: SHIPPED | OUTPATIENT
Start: 2025-06-26 | End: 2025-06-26

## 2025-06-26 RX ORDER — CARVEDILOL 3.12 MG/1
TABLET ORAL
Qty: 60 TABLET | Refills: 0 | Status: SHIPPED | OUTPATIENT
Start: 2025-06-26

## 2025-06-26 RX ORDER — ALBUTEROL SULFATE 90 UG/1
2 INHALANT RESPIRATORY (INHALATION) EVERY 6 HOURS PRN
Qty: 8.5 G | Refills: 0 | Status: SHIPPED | OUTPATIENT
Start: 2025-06-26 | End: 2025-06-26

## 2025-06-26 RX ORDER — BUDESONIDE AND FORMOTEROL FUMARATE DIHYDRATE 160; 4.5 UG/1; UG/1
AEROSOL RESPIRATORY (INHALATION)
Qty: 10.2 G | Refills: 0 | Status: SHIPPED | OUTPATIENT
Start: 2025-06-26 | End: 2025-06-26

## 2025-06-26 RX ORDER — EMPAGLIFLOZIN 25 MG/1
TABLET, FILM COATED ORAL
Qty: 30 TABLET | Refills: 0 | Status: SHIPPED | OUTPATIENT
Start: 2025-06-26 | End: 2025-06-26

## 2025-06-26 RX ORDER — EMPAGLIFLOZIN 25 MG/1
TABLET, FILM COATED ORAL
Qty: 30 TABLET | Refills: 0 | Status: SHIPPED | OUTPATIENT
Start: 2025-06-26

## 2025-06-26 RX ORDER — PROPARACAINE HYDROCHLORIDE 5 MG/ML
2 SOLUTION/ DROPS OPHTHALMIC ONCE
Status: COMPLETED | OUTPATIENT
Start: 2025-06-26 | End: 2025-06-26

## 2025-06-26 RX ORDER — CARVEDILOL 3.12 MG/1
TABLET ORAL
Qty: 60 TABLET | Refills: 0 | Status: SHIPPED | OUTPATIENT
Start: 2025-06-26 | End: 2025-06-26

## 2025-06-26 RX ORDER — BUDESONIDE AND FORMOTEROL FUMARATE DIHYDRATE 160; 4.5 UG/1; UG/1
AEROSOL RESPIRATORY (INHALATION)
Qty: 10.2 G | Refills: 0 | Status: SHIPPED | OUTPATIENT
Start: 2025-06-26

## 2025-06-26 RX ADMIN — PROPARACAINE HYDROCHLORIDE 2 DROP: 5 SOLUTION/ DROPS OPHTHALMIC at 19:45

## 2025-06-26 NOTE — ED TRIAGE NOTES
Chief Complaint   Patient presents with    Hand Swelling     Hand swelling x 2 days.     Shortness of Breath     X one week     Diarrhea     X 2 days.

## 2025-06-27 LAB — EKG IMPRESSION: NORMAL

## 2025-06-27 NOTE — ED NOTES
Bedside report received from off going RN/tech: Kirby WALLACE, assumed care of patient.  POC discussed with patient. Call light within reach, all needs addressed at this time.       Fall risk interventions in place: Move the patient closer to the nurse's station, Patient's personal possessions are with in their safe reach, Place socks on patient, Place fall risk sign on patient's door, Give patient urinal if applicable, and Keep floor surfaces clean and dry (all applicable per [place] Fall risk assessment)   Continuous monitoring: Cardiac Leads, Pulse Ox, or Blood Pressure  IVF/IV medications: Not Applicable   Oxygen: Room Air  Bedside sitter: Not Applicable   Isolation: Not Applicable

## 2025-06-27 NOTE — ED PROVIDER NOTES
ED Provider Note    CHIEF COMPLAINT  Chief Complaint   Patient presents with    Hand Swelling     Hand swelling x 2 days.     Shortness of Breath     X one week     Diarrhea     X 2 days.        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    HPI/ROS  LIMITATION TO HISTORY   Select: : None  OUTSIDE HISTORIAN(S):  Friend/ at bedside    Rip Guidry Jr. is a 64 y.o. male with a reported past medical history of congestive heart failure COPD tobacco use disorder diabetes presenting to the emergency department for shortness of breath dyspnea on exertion some swelling in his hands as well as some blurriness in his visual field.  Says that he was recently released from incarceration about 3 weeks ago.  Since he was discharged from the hospital he has not been on any medications that were prescribed by his primary care physician at [facility].  Says that his symptoms seem to develop several days ago.  He has been at the [facility] he noticed some worsening dyspnea on exertion.  He is wheelchair-bound.  He was brought in by his friend/ who convinced him to come into the emergency department today due to his constellation of symptoms.  He denies any active chest pain nor abdominal pain nausea or vomiting.  He has had loose nonbloody stools over the last several days.  In regards to his shortness of breath he feels like he is short of breath when he exerts himself.  He says that he supposed to be on medication for congestive heart failure and COPD.  He continues smoke about half pack a day.  He says he has a remote history of methamphetamine use but he has not been using recently.    In regards to his hand swelling he noticed hand swelling in the left hand greater than the right which has been progressive over the  last several days    Regards to his vision, the patient says that he seems to have worsening blurriness in his visual fields bilaterally.  He has no dark spots nor reduction in his peripheral visual field.  He has no associated eye pain.  He says he has been diagnosed with cataracts in the past but has never been formally diagnosed with glaucoma.  He says he has never seen an ophthalmologist in the past.     PAST MEDICAL HISTORY   has a past medical history of Asthma, Congestive heart failure (HCC), Diabetes (HCC), and Indigestion.    SURGICAL HISTORY   has a past surgical history that includes other orthopedic surgery; other; urology surgery; and lumbar fusion posterior percutaneous.    FAMILY HISTORY  History reviewed. No pertinent family history.    SOCIAL HISTORY  Social History     Tobacco Use    Smoking status: Every Day     Current packs/day: 1.00     Types: Cigarettes    Smokeless tobacco: Never   Vaping Use    Vaping status: Never Used   Substance and Sexual Activity    Alcohol use: Not Currently    Drug use: Not on file    Sexual activity: Not on file       CURRENT MEDICATIONS  Home Medications       Reviewed by Owen Leigh R.N. (Registered Nurse) on 06/26/25 at 2312  Med List Status: Not Addressed     Medication Last Dose Status   albuterol 108 (90 Base) MCG/ACT Aero Soln inhalation aerosol  Active   atorvastatin (LIPITOR) 40 MG Tab  Active   budesonide-formoterol (SYMBICORT) 160-4.5 MCG/ACT Aerosol  Active   carvedilol (COREG) 3.125 MG Tab  Active   celecoxib (CELEBREX) 200 MG Cap  Active   cyclobenzaprine (FLEXERIL) 10 mg Tab  Active   diphenhydrAMINE (BENADRYL) 50 MG Cap  Active   DULoxetine (CYMBALTA) 60 MG Cap DR Particles delayed-release capsule  Active   Empagliflozin (JARDIANCE) 25 MG Tab  Active   fluticasone (FLONASE) 50 MCG/ACT nasal spray  Active   furosemide (LASIX) 40 MG Tab  Active   gabapentin (NEURONTIN) 300 MG Cap  Active   hydrOXYzine HCl (ATARAX) 25 MG Tab  Active   loratadine  "(CLARITIN) 10 MG Tab  Active   metFORMIN (GLUCOPHAGE) 500 MG Tab  Active   Naloxone (NARCAN) 4 MG/0.1ML Liquid  Active   omeprazole (PRILOSEC) 20 MG delayed-release capsule  Active   oxyCODONE immediate-release (ROXICODONE) 5 MG Tab  Active   oxyCODONE-acetaminophen (PERCOCET-10)  MG Tab  Active   potassium chloride SA (KDUR) 20 MEQ Tab CR  Active   predniSONE (DELTASONE) 20 MG Tab  Active   SPIRIVA RESPIMAT 2.5 MCG/ACT Aero Soln  Active   spironolactone (ALDACTONE) 50 MG Tab  Active   traZODone (DESYREL) 50 MG Tab  Active   TRELEGY ELLIPTA 100-62.5-25 MCG/ACT inhaler  Active                    ALLERGIES  Allergies[1]    PHYSICAL EXAM  VITAL SIGNS: BP (!) 154/68   Pulse 75   Temp 36.3 °C (97.3 °F) (Temporal)   Resp 20   Ht 1.727 m (5' 8\")   Wt 112 kg (248 lb)   SpO2 91%   BMI 37.71 kg/m²    General:  non-toxic, no acute distress,   Neuro: oriented x 3, moving all extremities.   HEENT:   - Head: Normocephalic, atraumatic  - Eyes: PERRL  Eye Exam  Visual acuity: OD: 20/50 OS: 20/70 OU: 20/70  Pressure: OD 8 OS 10  Conjunctiva: No injection  Sclera: No evidence of inflammation  - Ears/Nose: normal external nose and ears  - Mouth: moist mucosal membranes  Resp: clear to auscultation, no increased work of breathing no Rales no wheezes  CV: Regular rate and rhythm  Abd: Soft, non-tender, non-distended  Extremities: Mild symmetric peripheral edema.  Mild swelling to the hands no evidence of cellulitis no induration or erythema.  Psych: lucid and conversational         DIAGNOSTIC STUDIES / PROCEDURES    LABS and ECG  Results for orders placed or performed during the hospital encounter of 06/26/25   CBC with Differential    Collection Time: 06/26/25  5:10 PM   Result Value Ref Range    WBC 12.9 (H) 4.8 - 10.8 K/uL    RBC 5.20 4.70 - 6.10 M/uL    Hemoglobin 15.1 14.0 - 18.0 g/dL    Hematocrit 46.7 42.0 - 52.0 %    MCV 89.8 81.4 - 97.8 fL    MCH 29.0 27.0 - 33.0 pg    MCHC 32.3 32.3 - 36.5 g/dL    RDW 42.1 35.9 - " 50.0 fL    Platelet Count 318 164 - 446 K/uL    MPV 10.7 9.0 - 12.9 fL    Neutrophils-Polys 69.40 44.00 - 72.00 %    Lymphocytes 19.80 (L) 22.00 - 41.00 %    Monocytes 6.70 0.00 - 13.40 %    Eosinophils 3.00 0.00 - 6.90 %    Basophils 0.70 0.00 - 1.80 %    Immature Granulocytes 0.40 0.00 - 0.90 %    Nucleated RBC 0.00 0.00 - 0.20 /100 WBC    Neutrophils (Absolute) 8.95 (H) 1.82 - 7.42 K/uL    Lymphs (Absolute) 2.56 1.00 - 4.80 K/uL    Monos (Absolute) 0.86 (H) 0.00 - 0.85 K/uL    Eos (Absolute) 0.39 0.00 - 0.51 K/uL    Baso (Absolute) 0.09 0.00 - 0.12 K/uL    Immature Granulocytes (abs) 0.05 0.00 - 0.11 K/uL    NRBC (Absolute) 0.00 K/uL   Comp Metabolic Panel    Collection Time: 06/26/25  5:10 PM   Result Value Ref Range    Sodium 141 135 - 145 mmol/L    Potassium 4.2 3.6 - 5.5 mmol/L    Chloride 107 96 - 112 mmol/L    Co2 24 20 - 33 mmol/L    Anion Gap 10.0 7.0 - 16.0    Glucose 152 (H) 65 - 99 mg/dL    Bun 12 8 - 22 mg/dL    Creatinine 0.91 0.50 - 1.40 mg/dL    Calcium 8.9 8.5 - 10.5 mg/dL    Correct Calcium 9.3 8.5 - 10.5 mg/dL    AST(SGOT) 15 12 - 45 U/L    ALT(SGPT) 11 2 - 50 U/L    Alkaline Phosphatase 79 30 - 99 U/L    Total Bilirubin <0.2 0.1 - 1.5 mg/dL    Albumin 3.5 3.2 - 4.9 g/dL    Total Protein 6.9 6.0 - 8.2 g/dL    Globulin 3.4 1.9 - 3.5 g/dL    A-G Ratio 1.0 g/dL   proBrain Natriuretic Peptide, NT    Collection Time: 06/26/25  5:10 PM   Result Value Ref Range    NT-proBNP 342 (H) 0 - 125 pg/mL   Troponin    Collection Time: 06/26/25  5:10 PM   Result Value Ref Range    Troponin T 13 6 - 19 ng/L   ESTIMATED GFR    Collection Time: 06/26/25  5:10 PM   Result Value Ref Range    GFR (CKD-EPI) 93 >60 mL/min/1.73 m 2   EKG    Collection Time: 06/27/25 10:19 PM   Result Value Ref Range    Report       [facility] Emergency Dept.    Test Date:  2025-06-26  Pt Name:    STEVEN MAHONEY             Department: ER  MRN:        [id number]                      Room:  Gender:     Male                          Technician: [id number]  :        [id number]                   Requested By:ER TRIAGE PROTOCOL  Order #:    [id number]                    Reading MD: Demarcus Cardoso    Measurements  Intervals                                Axis  Rate:       78                           P:          35  WA:         177                          QRS:        8  QRSD:       105                          T:          47  QT:         359  QTc:        409    Interpretive Statements  Sinus rhythm  Compared to ECG 2024 11:33:52  No significant changes  No st or t changes  Electronically Signed On 2025 22:19:12 PDT by Demarcus Cardoso         RADIOLOGY  I have independently interpreted the diagnostic imaging associated with this visit and am waiting the final reading from the radiologist.   My preliminary interpretation is as follows:   - Plain film of the chest shows mild bibasilar atelectasis but no focal infiltrate  Radiologist interpretation:   DX-CHEST-PORTABLE (1 VIEW)   Final Result      1.  No acute cardiac or pulmonary abnormality is identified.      2.  Probable mild bibasilar atelectasis versus pneumonitis.              MEDICAL DECISION MAKING      ED COURSE AND PLAN    This is a 64-year-old gentleman presenting to the emergency department for constellation of different symptoms likely related to the fact that he has been on his home medication for several weeks.  He was recently incarcerated released about 3 weeks ago and he has not restarted any of his medications.  He has a history of CHF COPD obesity diabetes tobacco use disorder and a remote history of methamphetamine use disorder.    On arrival to the emergency department he is hypertensive but his vital signs are otherwise stable he is satting in the mid 90s on room air.  Basic labs were obtained per triage protocol EKG was evaluated shows no dysrhythmia or ischemic changes  Chest x-ray shows no obvious lobar infiltrate shows bibasilar atelectasis.  His labs are  overall reassuring he has a elevation in BNP no prior to compare to does have a history of congestive heart failure.  His troponin is negative.n electrolytes and renal function are preserved.    I checked the pressure in his eyes and they were normal.  He has no associated eye pain.  His blurry vision is relatively mild, he certainly needs to get into see an optometrist given his history of  uncontrolled diabetes and no prior eye exam in the past, possibly needs to see an ophthalmologist but not necessarily emergently.    At this time I see no indication to admit him to the hospital.  I will restart his home medications.  I advised him to get into see his primary care physician and ophthalmologist           Procedures:      ----------------------------------------------------------------------------------  DISCUSSIONS    I have discussed management of the patient with the following physicians and MICHELLE's:      Discussion of management with other [facility] or appropriate source(s):     Escalation of care considered, and ultimately not performed: Considered no indication for hospitalization for diuresis and repeat echocardiogram     Barriers to care at this time, including but not limited to: Homeless, limited financial capacity, recently incarcerated.    Decision tools and prescription drugs considered including, but not limited to: I represcribed all of his home medications excluding narcotics    FINAL IMPRESSION    1. Medication refill        Discharge Medication List as of 6/26/2025 10:38 PM          No follow-up provider specified.      DISPOSITION    Discharge home, Stable        This chart was dictated using an electronic voice recognition software. The chart has been reviewed and edited but there is still possibility for dictation errors due to limitation of software.    Demarcus Cardoso, DO 6/26/2025          [1]   Allergies  Allergen Reactions    Sulfa Drugs

## 2025-06-27 NOTE — ED NOTES
Patient's friendm Parris Chávez MA, [facility] mental health counselor requesting to change patient's pharmacy following discharge. This RN changed preferred pharmacy. VERN Cardoso aware. Pharmacy changed and Parris + patient notified.

## 2025-06-27 NOTE — ED NOTES
Patient resting comfortably. Gurney locked and in lowest position. No distress noted. Call light within reach.

## 2025-06-27 NOTE — DISCHARGE INSTRUCTIONS
Please follow-up with your primary care physician.  We represcribed the majority of your previous medications that you were previously on.

## 2025-06-27 NOTE — ED NOTES
Patient discharged in stable condition per orders. Patient verbalized understanding of all discharge instructions. All belongings accounted for. Patient wheeled to ER lobby with personal wheelchair.

## 2025-06-27 NOTE — ED NOTES
Patient wheeled himself to restroom with no assistance. Patient back to room and placed onto monitors.